# Patient Record
Sex: MALE | Race: WHITE | NOT HISPANIC OR LATINO | Employment: UNEMPLOYED | URBAN - METROPOLITAN AREA
[De-identification: names, ages, dates, MRNs, and addresses within clinical notes are randomized per-mention and may not be internally consistent; named-entity substitution may affect disease eponyms.]

---

## 2019-03-01 ENCOUNTER — HOSPITAL ENCOUNTER (INPATIENT)
Facility: HOSPITAL | Age: 34
LOS: 5 days | Discharge: HOME | DRG: 644 | End: 2019-03-06
Attending: EMERGENCY MEDICINE | Admitting: HOSPITALIST
Payer: MEDICAID

## 2019-03-01 DIAGNOSIS — E05.90 HYPERTHYROIDISM: ICD-10-CM

## 2019-03-01 DIAGNOSIS — I49.9 ARRHYTHMIA: ICD-10-CM

## 2019-03-01 DIAGNOSIS — E05.91 THYROTOXICOSIS WITH THYROTOXIC CRISIS, UNSPECIFIED THYROTOXICOSIS TYPE: Primary | ICD-10-CM

## 2019-03-01 DIAGNOSIS — I50.9 CHF (CONGESTIVE HEART FAILURE): ICD-10-CM

## 2019-03-01 DIAGNOSIS — E05.91 THYROID STORM: ICD-10-CM

## 2019-03-01 DIAGNOSIS — R73.9 HYPERGLYCEMIA: ICD-10-CM

## 2019-03-01 PROBLEM — E83.52 HYPERCALCEMIA: Status: ACTIVE | Noted: 2019-03-01

## 2019-03-01 PROBLEM — E44.0 MALNUTRITION OF MODERATE DEGREE: Status: ACTIVE | Noted: 2019-03-01

## 2019-03-01 LAB
ALBUMIN SERPL BCP-MCNC: 3.8 G/DL
ALP SERPL-CCNC: 149 U/L
ALT SERPL W/O P-5'-P-CCNC: 33 U/L
AMPHET+METHAMPHET UR QL: NEGATIVE
ANION GAP SERPL CALC-SCNC: 12 MMOL/L
AST SERPL-CCNC: 39 U/L
B-OH-BUTYR BLD STRIP-SCNC: 0.4 MMOL/L
BARBITURATES UR QL SCN>200 NG/ML: NEGATIVE
BASOPHILS # BLD AUTO: 0.1 K/UL
BASOPHILS NFR BLD: 0.6 %
BENZODIAZ UR QL SCN>200 NG/ML: NEGATIVE
BILIRUB SERPL-MCNC: 1.3 MG/DL
BILIRUB UR QL STRIP: NEGATIVE
BUN SERPL-MCNC: 17 MG/DL
BZE UR QL SCN: NEGATIVE
CALCIUM SERPL-MCNC: 12.4 MG/DL
CANNABINOIDS UR QL SCN: NEGATIVE
CHLORIDE SERPL-SCNC: 101 MMOL/L
CLARITY UR: CLEAR
CO2 SERPL-SCNC: 27 MMOL/L
COLOR UR: YELLOW
CREAT SERPL-MCNC: 0.7 MG/DL
CREAT UR-MCNC: 112.4 MG/DL
DIFFERENTIAL METHOD: ABNORMAL
EOSINOPHIL # BLD AUTO: 0.1 K/UL
EOSINOPHIL NFR BLD: 0.9 %
ERYTHROCYTE [DISTWIDTH] IN BLOOD BY AUTOMATED COUNT: 13.9 %
EST. GFR  (AFRICAN AMERICAN): >60 ML/MIN/1.73 M^2
EST. GFR  (NON AFRICAN AMERICAN): >60 ML/MIN/1.73 M^2
ESTIMATED AVG GLUCOSE: 111 MG/DL
GLUCOSE SERPL-MCNC: 103 MG/DL
GLUCOSE UR QL STRIP: NEGATIVE
HBA1C MFR BLD HPLC: 5.5 %
HCT VFR BLD AUTO: 40.2 %
HGB BLD-MCNC: 13.1 G/DL
HGB UR QL STRIP: ABNORMAL
KETONES UR QL STRIP: NEGATIVE
LEUKOCYTE ESTERASE UR QL STRIP: NEGATIVE
LIPASE SERPL-CCNC: 49 U/L
LYMPHOCYTES # BLD AUTO: 2.5 K/UL
LYMPHOCYTES NFR BLD: 22.2 %
MCH RBC QN AUTO: 25.3 PG
MCHC RBC AUTO-ENTMCNC: 32.6 G/DL
MCV RBC AUTO: 78 FL
METHADONE UR QL SCN>300 NG/ML: NEGATIVE
MONOCYTES # BLD AUTO: 1.1 K/UL
MONOCYTES NFR BLD: 10.1 %
NEUTROPHILS # BLD AUTO: 7.4 K/UL
NEUTROPHILS NFR BLD: 66.2 %
NITRITE UR QL STRIP: NEGATIVE
OPIATES UR QL SCN: NEGATIVE
PCP UR QL SCN>25 NG/ML: NEGATIVE
PH UR STRIP: 6 [PH] (ref 5–8)
PLATELET # BLD AUTO: 334 K/UL
PMV BLD AUTO: 8.5 FL
POCT GLUCOSE: 101 MG/DL (ref 70–110)
POTASSIUM SERPL-SCNC: 4.6 MMOL/L
PROT SERPL-MCNC: 7.8 G/DL
PROT UR QL STRIP: ABNORMAL
RBC # BLD AUTO: 5.17 M/UL
SODIUM SERPL-SCNC: 140 MMOL/L
SP GR UR STRIP: >=1.03 (ref 1–1.03)
T4 FREE SERPL-MCNC: 4.41 NG/DL
TOXICOLOGY INFORMATION: NORMAL
TSH SERPL DL<=0.005 MIU/L-ACNC: <0.01 UIU/ML
URN SPEC COLLECT METH UR: ABNORMAL
UROBILINOGEN UR STRIP-ACNC: NEGATIVE EU/DL
WBC # BLD AUTO: 11.2 K/UL

## 2019-03-01 PROCEDURE — 84439 ASSAY OF FREE THYROXINE: CPT

## 2019-03-01 PROCEDURE — 25000003 PHARM REV CODE 250: Performed by: EMERGENCY MEDICINE

## 2019-03-01 PROCEDURE — 25000003 PHARM REV CODE 250: Performed by: HOSPITALIST

## 2019-03-01 PROCEDURE — 80053 COMPREHEN METABOLIC PANEL: CPT

## 2019-03-01 PROCEDURE — 63600175 PHARM REV CODE 636 W HCPCS: Performed by: EMERGENCY MEDICINE

## 2019-03-01 PROCEDURE — 96374 THER/PROPH/DIAG INJ IV PUSH: CPT

## 2019-03-01 PROCEDURE — 83036 HEMOGLOBIN GLYCOSYLATED A1C: CPT

## 2019-03-01 PROCEDURE — 36415 COLL VENOUS BLD VENIPUNCTURE: CPT

## 2019-03-01 PROCEDURE — 81003 URINALYSIS AUTO W/O SCOPE: CPT

## 2019-03-01 PROCEDURE — 85025 COMPLETE CBC W/AUTO DIFF WBC: CPT

## 2019-03-01 PROCEDURE — 83690 ASSAY OF LIPASE: CPT

## 2019-03-01 PROCEDURE — 99285 EMERGENCY DEPT VISIT HI MDM: CPT

## 2019-03-01 PROCEDURE — 82010 KETONE BODYS QUAN: CPT

## 2019-03-01 PROCEDURE — 11000001 HC ACUTE MED/SURG PRIVATE ROOM

## 2019-03-01 PROCEDURE — 82962 GLUCOSE BLOOD TEST: CPT | Mod: 59

## 2019-03-01 PROCEDURE — 80307 DRUG TEST PRSMV CHEM ANLYZR: CPT

## 2019-03-01 PROCEDURE — 93005 ELECTROCARDIOGRAM TRACING: CPT

## 2019-03-01 PROCEDURE — 96361 HYDRATE IV INFUSION ADD-ON: CPT

## 2019-03-01 PROCEDURE — 84443 ASSAY THYROID STIM HORMONE: CPT

## 2019-03-01 RX ORDER — ONDANSETRON 2 MG/ML
INJECTION INTRAMUSCULAR; INTRAVENOUS
Status: DISPENSED
Start: 2019-03-01 | End: 2019-03-02

## 2019-03-01 RX ORDER — PROPRANOLOL HYDROCHLORIDE 20 MG/1
60 TABLET ORAL ONCE
Status: COMPLETED | OUTPATIENT
Start: 2019-03-01 | End: 2019-03-01

## 2019-03-01 RX ORDER — SODIUM CHLORIDE 0.9 % (FLUSH) 0.9 %
5 SYRINGE (ML) INJECTION
Status: DISCONTINUED | OUTPATIENT
Start: 2019-03-01 | End: 2019-03-06 | Stop reason: HOSPADM

## 2019-03-01 RX ORDER — GLUCAGON 1 MG
1 KIT INJECTION
Status: DISCONTINUED | OUTPATIENT
Start: 2019-03-01 | End: 2019-03-06 | Stop reason: HOSPADM

## 2019-03-01 RX ORDER — AMOXICILLIN 250 MG
1 CAPSULE ORAL 2 TIMES DAILY
Status: DISCONTINUED | OUTPATIENT
Start: 2019-03-01 | End: 2019-03-06 | Stop reason: HOSPADM

## 2019-03-01 RX ORDER — ONDANSETRON 2 MG/ML
4 INJECTION INTRAMUSCULAR; INTRAVENOUS
Status: COMPLETED | OUTPATIENT
Start: 2019-03-01 | End: 2019-03-01

## 2019-03-01 RX ORDER — RAMELTEON 8 MG/1
8 TABLET ORAL NIGHTLY PRN
Status: DISCONTINUED | OUTPATIENT
Start: 2019-03-01 | End: 2019-03-03

## 2019-03-01 RX ORDER — IBUPROFEN 200 MG
16 TABLET ORAL
Status: DISCONTINUED | OUTPATIENT
Start: 2019-03-01 | End: 2019-03-06 | Stop reason: HOSPADM

## 2019-03-01 RX ORDER — ACETAMINOPHEN 500 MG
1000 TABLET ORAL EVERY 6 HOURS PRN
Status: DISCONTINUED | OUTPATIENT
Start: 2019-03-01 | End: 2019-03-06 | Stop reason: HOSPADM

## 2019-03-01 RX ORDER — ONDANSETRON 2 MG/ML
8 INJECTION INTRAMUSCULAR; INTRAVENOUS EVERY 8 HOURS PRN
Status: DISCONTINUED | OUTPATIENT
Start: 2019-03-01 | End: 2019-03-06 | Stop reason: HOSPADM

## 2019-03-01 RX ORDER — PROPYLTHIOURACIL 50 MG/1
200 TABLET ORAL EVERY 4 HOURS
Status: DISCONTINUED | OUTPATIENT
Start: 2019-03-01 | End: 2019-03-03

## 2019-03-01 RX ORDER — SODIUM CHLORIDE 9 MG/ML
INJECTION, SOLUTION INTRAVENOUS CONTINUOUS
Status: DISCONTINUED | OUTPATIENT
Start: 2019-03-01 | End: 2019-03-04

## 2019-03-01 RX ORDER — LANOLIN ALCOHOL/MO/W.PET/CERES
800 CREAM (GRAM) TOPICAL
Status: DISCONTINUED | OUTPATIENT
Start: 2019-03-01 | End: 2019-03-06 | Stop reason: HOSPADM

## 2019-03-01 RX ORDER — PROPRANOLOL HYDROCHLORIDE 20 MG/1
40 TABLET ORAL EVERY 6 HOURS
Status: DISCONTINUED | OUTPATIENT
Start: 2019-03-02 | End: 2019-03-03

## 2019-03-01 RX ORDER — IBUPROFEN 200 MG
24 TABLET ORAL
Status: DISCONTINUED | OUTPATIENT
Start: 2019-03-01 | End: 2019-03-06 | Stop reason: HOSPADM

## 2019-03-01 RX ORDER — POTASSIUM CHLORIDE 20 MEQ/15ML
40 SOLUTION ORAL
Status: DISCONTINUED | OUTPATIENT
Start: 2019-03-01 | End: 2019-03-06 | Stop reason: HOSPADM

## 2019-03-01 RX ORDER — PROPYLTHIOURACIL 50 MG/1
200 TABLET ORAL ONCE
Status: COMPLETED | OUTPATIENT
Start: 2019-03-01 | End: 2019-03-01

## 2019-03-01 RX ADMIN — SODIUM CHLORIDE 1000 ML: 0.9 INJECTION, SOLUTION INTRAVENOUS at 04:03

## 2019-03-01 RX ADMIN — PROPYLTHIOURACIL 200 MG: 50 TABLET ORAL at 11:03

## 2019-03-01 RX ADMIN — PROPRANOLOL HYDROCHLORIDE 40 MG: 20 TABLET ORAL at 11:03

## 2019-03-01 RX ADMIN — SODIUM CHLORIDE: 0.9 INJECTION, SOLUTION INTRAVENOUS at 08:03

## 2019-03-01 RX ADMIN — ONDANSETRON 4 MG: 2 INJECTION INTRAMUSCULAR; INTRAVENOUS at 04:03

## 2019-03-01 RX ADMIN — PROPYLTHIOURACIL 200 MG: 50 TABLET ORAL at 06:03

## 2019-03-01 RX ADMIN — PROPRANOLOL HYDROCHLORIDE 60 MG: 20 TABLET ORAL at 06:03

## 2019-03-01 NOTE — PROGRESS NOTES
03/01/201912:22 PM  I have evaluated and performed a medical screening assessment on this patient while awaiting a thorough evaluation and treatment. All of the emergency department beds are occupied at this time. When appropriate, laboratory studies will be ordered from triage. The patient has been advised of this process and care plan. Patient complains of hyperglycemia > 300; increased urine output; no history of DM; vomiting and loosing weight. Fasting sugar this morning was 126.  Weight loss.     Orders pending: labs.   Disposition: stable

## 2019-03-01 NOTE — ED PROVIDER NOTES
Encounter Date: 3/1/2019    SCRIBE #1 NOTE: I, Radha Du, am scribing for, and in the presence of, Baldemar Ayala MD.       History     Chief Complaint   Patient presents with    Hyperglycemia     non diagnosed diabetic       Time seen by provider: 4:17 PM on 03/01/2019    Jeffrey Chavez is a 33 y.o. male who presents to the ED for evaluation of hyperglycemia. The patient reports that he has been experiencing increased urination, shakiness, and palpitations for a few weeks. He has also lost about 50 pounds in 1.5 months. His wife was testing his blood sugar last night, and the meter read over 300 mg/dL. He tested it multiple times this morning and his blood sugar was normal despite not eating today. The patient also reports vomiting today. He denies drug use, energy drinks, pre-workout, stimulants, or weight-loss drugs.   The patient denies fever or any other symptoms at this time. No PMHx or SHx noted. Current everyday smoekr (0.5 ppd). Allergies: Ibuprofen.       The history is provided by the patient.     Review of patient's allergies indicates:   Allergen Reactions    Ibuprofen Hives and Swelling     History reviewed. No pertinent past medical history.  History reviewed. No pertinent surgical history.  Family History   Problem Relation Age of Onset    Alcohol abuse Father     Alcohol abuse Maternal Uncle      Social History     Tobacco Use    Smoking status: Current Every Day Smoker     Packs/day: 0.50     Types: Cigarettes   Substance Use Topics    Alcohol use: Yes     Comment: occ    Drug use: No     Review of Systems   Constitutional: Positive for unexpected weight change (50 pounds in 1.5 months). Negative for fever.   HENT: Negative for sore throat.    Respiratory: Negative for shortness of breath.    Cardiovascular: Positive for palpitations. Negative for chest pain.   Gastrointestinal: Negative for nausea.   Endocrine: Positive for polyuria.   Genitourinary: Negative for dysuria.  "  Musculoskeletal: Negative for back pain.   Skin: Negative for rash.   Neurological: Positive for tremors ("shaky"). Negative for weakness.   Hematological: Does not bruise/bleed easily.       Physical Exam     Initial Vitals [03/01/19 1224]   BP Pulse Resp Temp SpO2   138/88 (!) 135 16 98.7 °F (37.1 °C) 96 %      MAP       --         Physical Exam    Nursing note and vitals reviewed.  Constitutional: He appears well-developed and well-nourished.  Non-toxic appearance. No distress.   Noticeably jittery.    HENT:   Head: Normocephalic and atraumatic.   Eyes: EOM are normal. Pupils are equal, round, and reactive to light.   No appreciable exopthalmos.   Neck: Normal range of motion. Neck supple. Thyromegaly present. No neck rigidity. No JVD present.   Cardiovascular: Regular rhythm, normal heart sounds and intact distal pulses. Tachycardia present.  Exam reveals no gallop and no friction rub.    No murmur heard.  Pulmonary/Chest: Breath sounds normal. He has no wheezes. He has no rhonchi. He has no rales.   Abdominal: Soft. Bowel sounds are normal. He exhibits no distension. There is no tenderness. There is no rigidity, no rebound and no guarding.   Musculoskeletal: Normal range of motion.   Neurological: He is alert and oriented to person, place, and time. He has normal strength and normal reflexes. No cranial nerve deficit or sensory deficit. He exhibits normal muscle tone. Coordination normal. GCS eye subscore is 4. GCS verbal subscore is 5. GCS motor subscore is 6.   Skin: Skin is warm and dry.   Psychiatric: He has a normal mood and affect. His speech is normal and behavior is normal. He is not actively hallucinating.         ED Course   Procedures  Labs Reviewed   CBC W/ AUTO DIFFERENTIAL - Abnormal; Notable for the following components:       Result Value    Hemoglobin 13.1 (*)     MCV 78 (*)     MCH 25.3 (*)     MPV 8.5 (*)     Mono # 1.1 (*)     All other components within normal limits   COMPREHENSIVE " METABOLIC PANEL - Abnormal; Notable for the following components:    Calcium 12.4 (*)     Total Bilirubin 1.3 (*)     Alkaline Phosphatase 149 (*)     All other components within normal limits    Narrative:      CA critical result(s) called and verbal readback obtained from   Nadja Zambrano RN, 03/01/2019 13:16   URINALYSIS, REFLEX TO URINE CULTURE - Abnormal; Notable for the following components:    Specific Gravity, UA >=1.030 (*)     Protein, UA Trace (*)     Occult Blood UA Trace (*)     All other components within normal limits    Narrative:     Preferred Collection Type->Urine, Clean Catch   TSH - Abnormal; Notable for the following components:    TSH <0.010 (*)     All other components within normal limits   T4, FREE - Abnormal; Notable for the following components:    Free T4 4.41 (*)     All other components within normal limits   BETA - HYDROXYBUTYRATE, SERUM   HEMOGLOBIN A1C   LIPASE   DRUG SCREEN PANEL, URINE EMERGENCY   POCT GLUCOSE   POCT GLUCOSE MONITORING CONTINUOUS        ECG Results          EKG 12-lead (In process)  Result time 03/01/19 15:55:44    In process by Interface, Lab In [facility] (03/01/19 15:55:44)                 Narrative:    Test Reason : R73.9,    Vent. Rate : 127 BPM     Atrial Rate : 127 BPM     P-R Int : 124 ms          QRS Dur : 090 ms      QT Int : 306 ms       P-R-T Axes : 054 068 055 degrees     QTc Int : 444 ms    Sinus tachycardia  Otherwise normal ECG  No previous ECGs available    Referred By: [email or web address]   SELF           Confirmed By:                             Imaging Results          US Soft Tissue Head Neck Thyroid (In process)                  Medical Decision Making:   History:   Old Medical Records: I decided to obtain old medical records.  Initial Assessment:   Patient is a 33-year-old man with 50 lb weight loss in a month and a half, tachycardia, tremors.  He is noted to have hyperthyroidism previously undiagnosed.  He has thyromegaly index abdominal son examination  as well as tachycardia.  Urine drug screen is negative. Patient has critically elevated calcium of 12.4 and is given IV fluids.  No changes in altered mental status.  I have low suspicion for decompensated heart failure, shock, malignant dysrhythmias or thromboembolic event.  Discussed with the hospitalist who agrees to admit the patient for further management.  Clinical Tests:   Lab Tests: Ordered and Reviewed  Radiological Study: Ordered and Reviewed            Scribe Attestation:   Scribe #1: I performed the above scribed service and the documentation accurately describes the services I performed. I attest to the accuracy of the note.    I, Jamie Johnson, personally performed the services described in this documentation. All medical record entries made by the scribe were at my direction and in my presence.  I have reviewed the chart and agree that the record reflects my personal performance and is accurate and complete. Baldemar Ayala MD.  10:42 PM 03/01/2019       DISCLAIMER: This note was prepared with [employer] voice recognition transcription software. Garbled syntax, mangled pronouns, and other bizarre constructions may be attributed to that software system.             Clinical Impression:     1. Hyperglycemia    2. Thyroid storm    3. CHF (congestive heart failure)                                 Baldemar Ayala MD  03/01/19 2452

## 2019-03-02 LAB
ALBUMIN SERPL BCP-MCNC: 3.1 G/DL
ALP SERPL-CCNC: 120 U/L
ALT SERPL W/O P-5'-P-CCNC: 37 U/L
ANION GAP SERPL CALC-SCNC: 8 MMOL/L
AORTIC ROOT ANNULUS: 2.98 CM
AORTIC VALVE CUSP SEPERATION: 1.67 CM
AST SERPL-CCNC: 49 U/L
AV INDEX (PROSTH): 0.67
AV MEAN GRADIENT: 9.57 MMHG
AV PEAK GRADIENT: 15.84 MMHG
AV VALVE AREA: 1.99 CM2
AV VELOCITY RATIO: 0.77
BASOPHILS # BLD AUTO: 0 K/UL
BASOPHILS NFR BLD: 0.5 %
BILIRUB SERPL-MCNC: 1.4 MG/DL
BSA FOR ECHO PROCEDURE: 1.85 M2
BUN SERPL-MCNC: 16 MG/DL
CALCIUM SERPL-MCNC: 11.1 MG/DL
CHLORIDE SERPL-SCNC: 103 MMOL/L
CO2 SERPL-SCNC: 25 MMOL/L
CREAT SERPL-MCNC: 0.6 MG/DL
CV ECHO LV RWT: 0.35 CM
DIFFERENTIAL METHOD: ABNORMAL
DOP CALC AO PEAK VEL: 1.99 M/S
DOP CALC AO VTI: 34.78 CM
DOP CALC LVOT AREA: 2.95 CM2
DOP CALC LVOT DIAMETER: 1.94 CM
DOP CALC LVOT PEAK VEL: 1.53 M/S
DOP CALC LVOT STROKE VOLUME: 69.28 CM3
DOP CALCLVOT PEAK VEL VTI: 23.45 CM
E WAVE DECELERATION TIME: 207.8 MSEC
E/A RATIO: 1.58
E/E' RATIO: 10.95
ECHO LV POSTERIOR WALL: 0.9 CM (ref 0.6–1.1)
EOSINOPHIL # BLD AUTO: 0.1 K/UL
EOSINOPHIL NFR BLD: 1.5 %
ERYTHROCYTE [DISTWIDTH] IN BLOOD BY AUTOMATED COUNT: 13.6 %
EST. GFR  (AFRICAN AMERICAN): >60 ML/MIN/1.73 M^2
EST. GFR  (NON AFRICAN AMERICAN): >60 ML/MIN/1.73 M^2
FRACTIONAL SHORTENING: 37 % (ref 28–44)
GLUCOSE SERPL-MCNC: 104 MG/DL
HCT VFR BLD AUTO: 34.3 %
HGB BLD-MCNC: 11.3 G/DL
INTERVENTRICULAR SEPTUM: 0.9 CM (ref 0.6–1.1)
IVRT: 0.06 MSEC
LEFT ATRIUM SIZE: 3.61 CM
LEFT INTERNAL DIMENSION IN SYSTOLE: 3.23 CM (ref 2.1–4)
LEFT VENTRICLE DIASTOLIC VOLUME INDEX: 68.35 ML/M2
LEFT VENTRICLE DIASTOLIC VOLUME: 125.88 ML
LEFT VENTRICLE MASS INDEX: 90 G/M2
LEFT VENTRICLE SYSTOLIC VOLUME INDEX: 22.7 ML/M2
LEFT VENTRICLE SYSTOLIC VOLUME: 41.83 ML
LEFT VENTRICULAR INTERNAL DIMENSION IN DIASTOLE: 5.14 CM (ref 3.5–6)
LEFT VENTRICULAR MASS: 165.72 G
LV LATERAL E/E' RATIO: 10.45
LV SEPTAL E/E' RATIO: 11.5
LYMPHOCYTES # BLD AUTO: 1.4 K/UL
LYMPHOCYTES NFR BLD: 28 %
MAGNESIUM SERPL-MCNC: 1.7 MG/DL
MCH RBC QN AUTO: 25.4 PG
MCHC RBC AUTO-ENTMCNC: 32.8 G/DL
MCV RBC AUTO: 77 FL
MONOCYTES # BLD AUTO: 0.2 K/UL
MONOCYTES NFR BLD: 4.5 %
MV PEAK A VEL: 0.73 M/S
MV PEAK E VEL: 1.15 M/S
NEUTROPHILS # BLD AUTO: 3.2 K/UL
NEUTROPHILS NFR BLD: 65.5 %
PHOSPHATE SERPL-MCNC: 2.8 MG/DL
PISA TR MAX VEL: 2.79 M/S
PLATELET # BLD AUTO: 276 K/UL
PMV BLD AUTO: 8.8 FL
POCT GLUCOSE: 108 MG/DL (ref 70–110)
POCT GLUCOSE: 162 MG/DL (ref 70–110)
POTASSIUM SERPL-SCNC: 4.2 MMOL/L
PROT SERPL-MCNC: 6.5 G/DL
PV PEAK VELOCITY: 1.15 CM/S
RA PRESSURE: 15 MMHG
RBC # BLD AUTO: 4.44 M/UL
RIGHT VENTRICULAR END-DIASTOLIC DIMENSION: 2.98 CM
SODIUM SERPL-SCNC: 136 MMOL/L
T3FREE SERPL-MCNC: >20 PG/ML
T4 FREE SERPL-MCNC: 4.46 NG/DL
TDI LATERAL: 0.11
TDI SEPTAL: 0.1
TDI: 0.11
TR MAX PG: 31.14 MMHG
TRICUSPID ANNULAR PLANE SYSTOLIC EXCURSION: 2.39 CM
TV REST PULMONARY ARTERY PRESSURE: 46 MMHG
WBC # BLD AUTO: 4.9 K/UL

## 2019-03-02 PROCEDURE — 84439 ASSAY OF FREE THYROXINE: CPT

## 2019-03-02 PROCEDURE — 11000001 HC ACUTE MED/SURG PRIVATE ROOM

## 2019-03-02 PROCEDURE — 25000003 PHARM REV CODE 250: Performed by: HOSPITALIST

## 2019-03-02 PROCEDURE — 83735 ASSAY OF MAGNESIUM: CPT

## 2019-03-02 PROCEDURE — 94761 N-INVAS EAR/PLS OXIMETRY MLT: CPT

## 2019-03-02 PROCEDURE — 63600175 PHARM REV CODE 636 W HCPCS: Performed by: HOSPITALIST

## 2019-03-02 PROCEDURE — 85025 COMPLETE CBC W/AUTO DIFF WBC: CPT

## 2019-03-02 PROCEDURE — 97161 PT EVAL LOW COMPLEX 20 MIN: CPT

## 2019-03-02 PROCEDURE — 84481 FREE ASSAY (FT-3): CPT

## 2019-03-02 PROCEDURE — 97116 GAIT TRAINING THERAPY: CPT

## 2019-03-02 PROCEDURE — 80053 COMPREHEN METABOLIC PANEL: CPT

## 2019-03-02 PROCEDURE — 36415 COLL VENOUS BLD VENIPUNCTURE: CPT

## 2019-03-02 PROCEDURE — 84100 ASSAY OF PHOSPHORUS: CPT

## 2019-03-02 PROCEDURE — S4991 NICOTINE PATCH NONLEGEND: HCPCS | Performed by: HOSPITALIST

## 2019-03-02 RX ORDER — NICOTINE 7MG/24HR
1 PATCH, TRANSDERMAL 24 HOURS TRANSDERMAL DAILY
Status: DISCONTINUED | OUTPATIENT
Start: 2019-03-02 | End: 2019-03-06 | Stop reason: HOSPADM

## 2019-03-02 RX ADMIN — PROPYLTHIOURACIL 200 MG: 50 TABLET ORAL at 02:03

## 2019-03-02 RX ADMIN — HYDROCORTISONE SODIUM SUCCINATE 100 MG: 100 INJECTION, POWDER, FOR SOLUTION INTRAMUSCULAR; INTRAVENOUS at 02:03

## 2019-03-02 RX ADMIN — HYDROCORTISONE SODIUM SUCCINATE 100 MG: 100 INJECTION, POWDER, FOR SOLUTION INTRAMUSCULAR; INTRAVENOUS at 10:03

## 2019-03-02 RX ADMIN — PROPYLTHIOURACIL 200 MG: 50 TABLET ORAL at 05:03

## 2019-03-02 RX ADMIN — PROPYLTHIOURACIL 200 MG: 50 TABLET ORAL at 09:03

## 2019-03-02 RX ADMIN — NICOTINE 1 PATCH: 7 PATCH, EXTENDED RELEASE TRANSDERMAL at 06:03

## 2019-03-02 RX ADMIN — PROPRANOLOL HYDROCHLORIDE 40 MG: 20 TABLET ORAL at 05:03

## 2019-03-02 RX ADMIN — SODIUM CHLORIDE: 0.9 INJECTION, SOLUTION INTRAVENOUS at 09:03

## 2019-03-02 RX ADMIN — HYDROCORTISONE SODIUM SUCCINATE 100 MG: 100 INJECTION, POWDER, FOR SOLUTION INTRAMUSCULAR; INTRAVENOUS at 07:03

## 2019-03-02 RX ADMIN — SODIUM CHLORIDE: 0.9 INJECTION, SOLUTION INTRAVENOUS at 01:03

## 2019-03-02 RX ADMIN — PROPYLTHIOURACIL 200 MG: 50 TABLET ORAL at 06:03

## 2019-03-02 RX ADMIN — DOCUSATE SODIUM AND SENNOSIDES 1 TABLET: 8.6; 5 TABLET, FILM COATED ORAL at 09:03

## 2019-03-02 RX ADMIN — PROPYLTHIOURACIL 200 MG: 50 TABLET ORAL at 01:03

## 2019-03-02 RX ADMIN — PROPRANOLOL HYDROCHLORIDE 40 MG: 20 TABLET ORAL at 02:03

## 2019-03-02 RX ADMIN — PROPRANOLOL HYDROCHLORIDE 40 MG: 20 TABLET ORAL at 06:03

## 2019-03-02 RX ADMIN — PROPYLTHIOURACIL 200 MG: 50 TABLET ORAL at 10:03

## 2019-03-02 NOTE — ASSESSMENT & PLAN NOTE
May be due to thyrotoxicosis.  Monitor Ca while getting treatment.  ]  Calcium   Date Value Ref Range Status   03/02/2019 11.1 (H) 8.7 - 10.5 mg/dL Final

## 2019-03-02 NOTE — PT/OT/SLP DISCHARGE
Physical Therapy Discharge Summary    Name: Jeffrey Chavez  MRN: [id number]   Principal Problem: Thyrotoxicosis with thyrotoxic crisis     Patient Discharged from acute Physical Therapy on 03/02/19.  Please refer to prior PT noted date on 03/02/19 for functional status.     Assessment:     seen for eval only    Objective:     GOALS:   Multidisciplinary Problems     Physical Therapy Goals     Not on file                Reasons for Discontinuation of Therapy Services  Therapist determines that the patient will no longer benefit from therapy services.      Plan:     Patient Discharged to: In house with walking program.    Vega Carroll, PT  3/2/2019

## 2019-03-02 NOTE — PT/OT/SLP EVAL
Physical Therapy Evaluation    Patient Name:  Jeffrey Chavez   MRN:  [id number]    Recommendations:     Discharge Recommendations:  other (see comments)(home)   Discharge Equipment Recommendations: none   Barriers to discharge: None    Assessment:     Jeffrey Chavez is a 33 y.o. male admitted with a medical diagnosis of Thyrotoxicosis with thyrotoxic crisis.  He presents with the following impairments/functional limitations:    n/a.    Rehab Prognosis: n/a; patient would benefit from acute skilled PT services to address these deficits and reach maximum level of function.    Recent Surgery: * No surgery found *      Plan:     During this hospitalization, patient to be seen 1 x/week(eval only) to address the identified rehab impairments via   and progress toward the following goals:    · Plan of Care Expires:  03/02/19    Subjective     Chief Complaint: n/a  Patient/Family Comments/goals: n/a  Pain/Comfort:  · Pain Rating 1: 0/10    Patients cultural, spiritual, Hoahaoism conflicts given the current situation:      Living Environment:  Lives w/ family in 1-story home.  Prior to admission, patients level of function was independent.  Equipment used at home: none.  DME owned (not currently used): none.  Upon discharge, patient will have assistance from family.    Objective:     Communicated with RN prior to session.  Patient found supine upon PT entry to room.    General Precautions: Standard, contact   Orthopedic Precautions:N/A   Braces: N/A     Exams:  · RLE ROM: WNL  · RLE Strength: WNL  · LLE ROM: WNL  · LLE Strength: WNL    Functional Mobility:  · Bed Mobility:     · Supine to Sit: independence  · Transfers:     · Sit to Stand:  independence with no AD  · Gait: 150 ft w/ IV pole and mod indep.      Therapeutic Activities and Exercises:   n/a    AM-PAC 6 CLICK MOBILITY  Total Score:21     Patient left sitting EOB with all lines intact and call button in reach.    GOALS:   Multidisciplinary Problems      Physical Therapy Goals     Not on file                History:     History reviewed. No pertinent past medical history.    History reviewed. No pertinent surgical history.    Time Tracking:     PT Received On: 03/02/19  PT Start Time: 1330     PT Stop Time: 1355  PT Total Time (min): 25 min     Billable Minutes: Evaluation 15 and Gait Training 10      Vega Carroll, PT  03/02/2019

## 2019-03-02 NOTE — PLAN OF CARE
Problem: Adult Inpatient Plan of Care  Goal: Plan of Care Review  Outcome: Ongoing (interventions implemented as appropriate)  Pt. Rested well during the night. CT of chest and US carotid done tonight. TTE schedule for today. NM thyroid scan might not be able to be done until Wednesday or Thursday. Ambulates independently to the restroom. IV fluids going at 125 mL/hr. Daily wt. Labs monitor. Will continue to monitor.

## 2019-03-02 NOTE — PROGRESS NOTES
03/02/19 0812   Patient Assessment/Suction   Level of Consciousness (AVPU) alert   PRE-TX-O2   O2 Device (Oxygen Therapy) room air   SpO2 97 %   Pulse Oximetry Type Intermittent   $ Pulse Oximetry - Multiple Charge Pulse Oximetry - Multiple   Pulse 95   Resp 18

## 2019-03-02 NOTE — ASSESSMENT & PLAN NOTE
Patient was severe weight loss of approximately 50 lb.  Will consult Nutrition, and encourage patient p.o. Intake.  Likely etiology is related to hyperthyroidism.

## 2019-03-02 NOTE — H&P
"[facility] Hospital Medicine  History & Physical    Patient Name: Jeffrey Chavez  MRN: [id number]  Admission Date: 3/1/2019  Attending Physician: Adebayo Kincaid MD  Primary Care Provider: Primary Doctor No         Patient information was obtained from patient, past medical records and ER records.     Subjective:     Principal Problem:Thyrotoxicosis with thyrotoxic crisis    Chief Complaint:   Chief Complaint   Patient presents with    Hyperglycemia     non diagnosed diabetic        HPI: Patient is a 33-year-old with no past medical history who presents the hospital with a 50 lb weight loss over the past 2 months, as well as severe fatigue, tremor and difficulty sleeping.  Patient denies fever, night sweats, or other B symptoms.  He has had intermittent diarrhea and cough, but denies any other symptoms.  Patient states that he was trying to "tough it out "but on the day of admission he started developing shortness of breath and that scared him to come to the hospital.  In the emergency department, patient was found to be tachycardic, tachypneic, and with a fine tremor.  Labs indicate evidence of   Thyrotoxicosis.    History reviewed. No pertinent past medical history.    History reviewed. No pertinent surgical history.    Review of patient's allergies indicates:   Allergen Reactions    Ibuprofen Hives and Swelling       No current facility-administered medications on file prior to encounter.      Current Outpatient Medications on File Prior to Encounter   Medication Sig    [DISCONTINUED] hydrocodone-acetaminophen 5-325mg (NORCO) 5-325 mg per tablet Take 1 tablet by mouth every 6 (six) hours as needed for Pain.    [DISCONTINUED] methocarbamol (ROBAXIN) 500 MG Tab Take 2 tablets (1,000 mg total) by mouth 3 (three) times daily.     Family History     None        Tobacco Use    Smoking status: Current Every Day Smoker     Packs/day: 0.50     Types: Cigarettes   Substance and Sexual Activity    " Alcohol use: Yes     Comment: occ    Drug use: No    Sexual activity: Not on file     Review of Systems   Constitutional: Positive for activity change, appetite change, fatigue and unexpected weight change. Negative for fever.   HENT: Positive for sore throat. Negative for ear discharge and facial swelling.    Eyes: Negative for photophobia, pain and visual disturbance.   Respiratory: Positive for cough. Negative for apnea and shortness of breath.    Cardiovascular: Negative for chest pain and leg swelling.   Gastrointestinal: Positive for diarrhea. Negative for abdominal pain and blood in stool.   Endocrine: Negative for cold intolerance and heat intolerance.   Musculoskeletal: Negative for back pain and gait problem.   Skin: Negative for pallor and rash.   Neurological: Positive for tremors. Negative for speech difficulty and headaches.   Psychiatric/Behavioral: Positive for sleep disturbance. Negative for confusion, hallucinations and suicidal ideas. The patient is nervous/anxious.    All other systems reviewed and are negative.    Objective:     Vital Signs (Most Recent):  Temp: 97.9 °F (36.6 °C) (03/01/19 1912)  Pulse: (!) 125 (03/01/19 1912)  Resp: 16 (03/01/19 1912)  BP: (!) 143/76 (03/01/19 1912)  SpO2: 96 % (03/01/19 1912) Vital Signs (24h Range):  Temp:  [97.9 °F (36.6 °C)-98.7 °F (37.1 °C)] 97.9 °F (36.6 °C)  Pulse:  [119-147] 125  Resp:  [16] 16  SpO2:  [93 %-97 %] 96 %  BP: (116-143)/(63-88) 143/76     Weight: 68.6 kg (151 lb 3.8 oz)  Body mass index is 22.33 kg/m².    Physical Exam   Constitutional: He is oriented to person, place, and time. No distress.   Thin, ill-appearing young man.   HENT:   Head: Normocephalic.   Mouth/Throat: Oropharynx is clear and moist.   Eyes: Conjunctivae are normal. Right eye exhibits no discharge. Left eye exhibits no discharge. No scleral icterus.   Exophthalmos noted   Neck: No JVD present.   Greatly enlarged thyroid gland palpable.  Nontender.   Cardiovascular:  Normal heart sounds and intact distal pulses. Exam reveals no friction rub.   No murmur heard.  Tachycardia.   Pulmonary/Chest: Breath sounds normal. No respiratory distress. He has no wheezes.   Tachypnea with increased respiratory effort evident.   Abdominal: Soft. Bowel sounds are normal. He exhibits no distension. There is no tenderness.   Musculoskeletal: Normal range of motion. He exhibits no edema.   Lymphadenopathy:     He has no cervical adenopathy.   Neurological: He is alert and oriented to person, place, and time. He has normal reflexes.   Fine tremor noted   Skin: Skin is warm. No rash noted. He is not diaphoretic. No erythema.   Psychiatric: He has a normal mood and affect. His behavior is normal.   Nursing note and vitals reviewed.          Significant Labs: All pertinent labs within the past 24 hours have been reviewed.    Significant Imaging: I have reviewed all pertinent imaging results/findings within the past 24 hours.    Assessment/Plan:     * Thyrotoxicosis with thyrotoxic crisis     Patient with evidence of thyrotoxicosis and  Impending thyroid storm.  Will start patient on PTU, propranolol, glucocorticoids, and monitor patient closely.  No indication for SSKI currently.  Patient will need thyroid ultrasound, and uptake scan to look for toxic nodule versus Graves disease.  TSH less than 0.01.  Check free T4 and T3.  Will discuss case with Endocrinology.  No need for transfer at the moment, although the patient does not improve will potentially need radio ablation of his thyroid gland emergently versus thyroid surgery.  Will monitor patient very closely in the hospital.       Malnutrition of moderate degree     Patient was severe weight loss of approximately 50 lb.  Will consult Nutrition, and encourage patient p.o. Intake.  Likely etiology is related to hyperthyroidism.       Hypercalcemia     Literature review suggested hypercalcemia may be a secondary effect of thyrotoxicosis.  Will treat  with IV fluids to start with and monitor closely.         VTE Risk Mitigation (From admission, onward)        Ordered     IP VTE LOW RISK PATIENT  Once      03/01/19 1947     Place JENNY hose  Until discontinued      03/01/19 1947             Adebayo Kincaid MD  Department of Hospital Medicine   [facility]

## 2019-03-02 NOTE — ASSESSMENT & PLAN NOTE
Patient with evidence of thyrotoxicosis and  Impending thyroid storm.  Will start patient on PTU, propranolol, glucocorticoids, and monitor patient closely.  No indication for SSKI currently.  Patient will need thyroid ultrasound, and uptake scan to look for toxic nodule versus Graves disease.  TSH less than 0.01.  Check free T4 and T3.  Will discuss case with Endocrinology.  No need for transfer at the moment, although the patient does not improve will potentially need radio ablation of his thyroid gland emergently versus thyroid surgery.  Will monitor patient very closely in the hospital.

## 2019-03-02 NOTE — SUBJECTIVE & OBJECTIVE
History reviewed. No pertinent past medical history.    History reviewed. No pertinent surgical history.    Review of patient's allergies indicates:   Allergen Reactions    Ibuprofen Hives and Swelling       No current facility-administered medications on file prior to encounter.      Current Outpatient Medications on File Prior to Encounter   Medication Sig    [DISCONTINUED] hydrocodone-acetaminophen 5-325mg (NORCO) 5-325 mg per tablet Take 1 tablet by mouth every 6 (six) hours as needed for Pain.    [DISCONTINUED] methocarbamol (ROBAXIN) 500 MG Tab Take 2 tablets (1,000 mg total) by mouth 3 (three) times daily.     Family History     None        Tobacco Use    Smoking status: Current Every Day Smoker     Packs/day: 0.50     Types: Cigarettes   Substance and Sexual Activity    Alcohol use: Yes     Comment: occ    Drug use: No    Sexual activity: Not on file     Review of Systems   Constitutional: Positive for activity change, appetite change, fatigue and unexpected weight change. Negative for fever.   HENT: Positive for sore throat. Negative for ear discharge and facial swelling.    Eyes: Negative for photophobia, pain and visual disturbance.   Respiratory: Positive for cough. Negative for apnea and shortness of breath.    Cardiovascular: Negative for chest pain and leg swelling.   Gastrointestinal: Positive for diarrhea. Negative for abdominal pain and blood in stool.   Endocrine: Negative for cold intolerance and heat intolerance.   Musculoskeletal: Negative for back pain and gait problem.   Skin: Negative for pallor and rash.   Neurological: Positive for tremors. Negative for speech difficulty and headaches.   Psychiatric/Behavioral: Positive for sleep disturbance. Negative for confusion, hallucinations and suicidal ideas. The patient is nervous/anxious.    All other systems reviewed and are negative.    Objective:     Vital Signs (Most Recent):  Temp: 97.9 °F (36.6 °C) (03/01/19 1912)  Pulse: (!) 125  (03/01/19 1912)  Resp: 16 (03/01/19 1912)  BP: (!) 143/76 (03/01/19 1912)  SpO2: 96 % (03/01/19 1912) Vital Signs (24h Range):  Temp:  [97.9 °F (36.6 °C)-98.7 °F (37.1 °C)] 97.9 °F (36.6 °C)  Pulse:  [119-147] 125  Resp:  [16] 16  SpO2:  [93 %-97 %] 96 %  BP: (116-143)/(63-88) 143/76     Weight: 68.6 kg (151 lb 3.8 oz)  Body mass index is 22.33 kg/m².    Physical Exam   Constitutional: He is oriented to person, place, and time. No distress.   Thin, ill-appearing young man.   HENT:   Head: Normocephalic.   Mouth/Throat: Oropharynx is clear and moist.   Eyes: Conjunctivae are normal. Right eye exhibits no discharge. Left eye exhibits no discharge. No scleral icterus.   Exophthalmos noted   Neck: No JVD present.   Greatly enlarged thyroid gland palpable.  Nontender.   Cardiovascular: Normal heart sounds and intact distal pulses. Exam reveals no friction rub.   No murmur heard.  Tachycardia.   Pulmonary/Chest: Breath sounds normal. No respiratory distress. He has no wheezes.   Tachypnea with increased respiratory effort evident.   Abdominal: Soft. Bowel sounds are normal. He exhibits no distension. There is no tenderness.   Musculoskeletal: Normal range of motion. He exhibits no edema.   Lymphadenopathy:     He has no cervical adenopathy.   Neurological: He is alert and oriented to person, place, and time. He has normal reflexes.   Fine tremor noted   Skin: Skin is warm. No rash noted. He is not diaphoretic. No erythema.   Psychiatric: He has a normal mood and affect. His behavior is normal.   Nursing note and vitals reviewed.          Significant Labs: All pertinent labs within the past 24 hours have been reviewed.    Significant Imaging: I have reviewed all pertinent imaging results/findings within the past 24 hours.

## 2019-03-02 NOTE — HPI
"Patient is a 33-year-old with no past medical history who presents the hospital with a 50 lb weight loss over the past 2 months, as well as severe fatigue, tremor and difficulty sleeping.  Patient denies fever, night sweats, or other B symptoms.  He has had intermittent diarrhea and cough, but denies any other symptoms.  Patient states that he was trying to "tough it out "but on the day of admission he started developing shortness of breath and that scared him to come to the hospital.  In the emergency department, patient was found to be tachycardic, tachypneic, and with a fine tremor.  Labs indicate evidence of   Thyrotoxicosis.  "

## 2019-03-02 NOTE — PLAN OF CARE
03/02/19 1556   Discharge Assessment   Assessment Type Discharge Planning Assessment   Confirmed/corrected address and phone number on facesheet? Yes   Assessment information obtained from? Patient   Prior to hospitilization cognitive status: Alert/Oriented   Prior to hospitalization functional status: Independent   Current cognitive status: Alert/Oriented   Current Functional Status: Independent   Facility Arrived From: home   Lives With significant other;child(ze), dependent   Able to Return to Prior Arrangements yes   Is patient able to care for self after discharge? Yes   Who are your caregiver(s) and their phone number(s)? mother:  Anita Chavez  [phone]   Patient's perception of discharge disposition home or selfcare   Readmission Within the Last 30 Days no previous admission in last 30 days   Patient currently being followed by outpatient case management? No   Patient currently receives any other outside agency services? No   Equipment Currently Used at Home glucometer   Do you have any problems affording any of your prescribed medications? No   Is the patient taking medications as prescribed? yes   Does the patient have transportation home? Yes   Transportation Anticipated family or friend will provide   Does the patient receive services at the [facility]? No   Discharge Plan A Home with family   Discharge Plan B Home with family   DME Needed Upon Discharge  none   Patient/Family in Agreement with Plan yes

## 2019-03-03 LAB
ANION GAP SERPL CALC-SCNC: 8 MMOL/L
BUN SERPL-MCNC: 12 MG/DL
CA-I BLDV-SCNC: 1.33 MMOL/L
CALCIUM SERPL-MCNC: 10.2 MG/DL
CHLORIDE SERPL-SCNC: 106 MMOL/L
CO2 SERPL-SCNC: 26 MMOL/L
CREAT SERPL-MCNC: 0.6 MG/DL
EST. GFR  (AFRICAN AMERICAN): >60 ML/MIN/1.73 M^2
EST. GFR  (NON AFRICAN AMERICAN): >60 ML/MIN/1.73 M^2
GLUCOSE SERPL-MCNC: 101 MG/DL
POTASSIUM SERPL-SCNC: 3.4 MMOL/L
SODIUM SERPL-SCNC: 140 MMOL/L

## 2019-03-03 PROCEDURE — 82330 ASSAY OF CALCIUM: CPT

## 2019-03-03 PROCEDURE — 83520 IMMUNOASSAY QUANT NOS NONAB: CPT

## 2019-03-03 PROCEDURE — 93005 ELECTROCARDIOGRAM TRACING: CPT

## 2019-03-03 PROCEDURE — 25000003 PHARM REV CODE 250: Performed by: HOSPITALIST

## 2019-03-03 PROCEDURE — 11000001 HC ACUTE MED/SURG PRIVATE ROOM

## 2019-03-03 PROCEDURE — 80048 BASIC METABOLIC PNL TOTAL CA: CPT

## 2019-03-03 PROCEDURE — S4991 NICOTINE PATCH NONLEGEND: HCPCS | Performed by: HOSPITALIST

## 2019-03-03 PROCEDURE — 36415 COLL VENOUS BLD VENIPUNCTURE: CPT

## 2019-03-03 PROCEDURE — 94761 N-INVAS EAR/PLS OXIMETRY MLT: CPT

## 2019-03-03 PROCEDURE — 84445 ASSAY OF TSI GLOBULIN: CPT

## 2019-03-03 PROCEDURE — 63600175 PHARM REV CODE 636 W HCPCS: Performed by: HOSPITALIST

## 2019-03-03 RX ORDER — DIAZEPAM 5 MG/1
10 TABLET ORAL NIGHTLY PRN
Status: DISCONTINUED | OUTPATIENT
Start: 2019-03-03 | End: 2019-03-06 | Stop reason: HOSPADM

## 2019-03-03 RX ORDER — PROPRANOLOL HYDROCHLORIDE 20 MG/1
20 TABLET ORAL EVERY 6 HOURS
Status: DISCONTINUED | OUTPATIENT
Start: 2019-03-03 | End: 2019-03-06 | Stop reason: HOSPADM

## 2019-03-03 RX ORDER — PROPYLTHIOURACIL 50 MG/1
100 TABLET ORAL EVERY 8 HOURS
Status: DISCONTINUED | OUTPATIENT
Start: 2019-03-03 | End: 2019-03-06 | Stop reason: HOSPADM

## 2019-03-03 RX ADMIN — NICOTINE 1 PATCH: 7 PATCH, EXTENDED RELEASE TRANSDERMAL at 09:03

## 2019-03-03 RX ADMIN — PROPYLTHIOURACIL 200 MG: 50 TABLET ORAL at 05:03

## 2019-03-03 RX ADMIN — PROPRANOLOL HYDROCHLORIDE 20 MG: 20 TABLET ORAL at 11:03

## 2019-03-03 RX ADMIN — PROPYLTHIOURACIL 200 MG: 50 TABLET ORAL at 09:03

## 2019-03-03 RX ADMIN — PROPYLTHIOURACIL 200 MG: 50 TABLET ORAL at 01:03

## 2019-03-03 RX ADMIN — SODIUM CHLORIDE: 0.9 INJECTION, SOLUTION INTRAVENOUS at 01:03

## 2019-03-03 RX ADMIN — HYDROCORTISONE SODIUM SUCCINATE 100 MG: 100 INJECTION, POWDER, FOR SOLUTION INTRAMUSCULAR; INTRAVENOUS at 05:03

## 2019-03-03 RX ADMIN — HYDROCORTISONE SODIUM SUCCINATE 100 MG: 100 INJECTION, POWDER, FOR SOLUTION INTRAMUSCULAR; INTRAVENOUS at 09:03

## 2019-03-03 RX ADMIN — PROPRANOLOL HYDROCHLORIDE 40 MG: 20 TABLET ORAL at 12:03

## 2019-03-03 RX ADMIN — PROPYLTHIOURACIL 100 MG: 50 TABLET ORAL at 09:03

## 2019-03-03 RX ADMIN — PROPRANOLOL HYDROCHLORIDE 40 MG: 20 TABLET ORAL at 05:03

## 2019-03-03 RX ADMIN — PROPRANOLOL HYDROCHLORIDE 40 MG: 20 TABLET ORAL at 01:03

## 2019-03-03 RX ADMIN — HYDROCORTISONE SODIUM SUCCINATE 100 MG: 100 INJECTION, POWDER, FOR SOLUTION INTRAMUSCULAR; INTRAVENOUS at 03:03

## 2019-03-03 RX ADMIN — SODIUM CHLORIDE: 0.9 INJECTION, SOLUTION INTRAVENOUS at 05:03

## 2019-03-03 RX ADMIN — PROPRANOLOL HYDROCHLORIDE 20 MG: 20 TABLET ORAL at 05:03

## 2019-03-03 NOTE — PLAN OF CARE
"Pt noted to have 1.6 second pauses on telemetry monitor. Pt asymptomatic at this time but stated he had felt like "my chest was uncomfortable last night and a little hard to breathe while asleep." Dr. Gaffney notified on unit and propanolol dose modified. Will continue to monitor.  "

## 2019-03-03 NOTE — PROGRESS NOTES
"[facility] Medicine  Progress Note    Patient Name: Jeffrey Chavez  MRN: [id number]  Patient Class: IP- Inpatient   Admission Date: 3/1/2019  Length of Stay: 1 days  Attending Physician: Russ Gaffney MD  Primary Care Provider: Primary Doctor No        Subjective:     Principal Problem:Thyrotoxicosis with thyrotoxic crisis    HPI:  Patient is a 33-year-old with no past medical history who presents the hospital with a 50 lb weight loss over the past 2 months, as well as severe fatigue, tremor and difficulty sleeping.  Patient denies fever, night sweats, or other B symptoms.  He has had intermittent diarrhea and cough, but denies any other symptoms.  Patient states that he was trying to "tough it out "but on the day of admission he started developing shortness of breath and that scared him to come to the hospital.  In the emergency department, patient was found to be tachycardic, tachypneic, and with a fine tremor.  Labs indicate evidence of   Thyrotoxicosis.    Hospital Course:  No notes on file    Interval History:  Pulse less than 100.  Much fewer muscle tremors.  No SOB.    Review of Systems   Constitutional: Negative for fatigue and fever.   Respiratory: Negative for cough and shortness of breath.    Cardiovascular: Negative for chest pain.   Gastrointestinal: Negative for abdominal pain.     Objective:     Vital Signs (Most Recent):  Temp: 96.1 °F (35.6 °C) (03/02/19 0807)  Pulse: 95 (03/02/19 0812)  Resp: 18 (03/02/19 0812)  BP: 130/61 (03/02/19 0807)  SpO2: 97 % (03/02/19 0812) Vital Signs (24h Range):  Temp:  [96.1 °F (35.6 °C)-97.7 °F (36.5 °C)] 96.1 °F (35.6 °C)  Pulse:  [93-97] 95  Resp:  [18] 18  SpO2:  [96 %-97 %] 97 %  BP: (129-130)/(60-61) 130/61     Weight: 71 kg (156 lb 8.4 oz)  Body mass index is 23.8 kg/m².    Intake/Output Summary (Last 24 hours) at 3/2/2019 2030  Last data filed at 3/2/2019 1800  Gross per 24 hour   Intake 2612.5 ml   Output --   Net 2612.5 ml "      Physical Exam   Constitutional: No distress.   HENT:   Mouth/Throat: No oropharyngeal exudate.   Eyes: Right eye exhibits no discharge. Left eye exhibits no discharge.   Neck: No JVD present. Thyromegaly present.   Cardiovascular: Normal rate and regular rhythm.   Pulmonary/Chest: Effort normal and breath sounds normal.   Abdominal: Soft. He exhibits no distension. There is no tenderness.   Musculoskeletal: He exhibits no edema.   Neurological: He is alert.   Skin: Skin is warm and dry. No rash noted. He is not diaphoretic.   Psychiatric: His behavior is normal.       Significant Labs: All pertinent labs within the past 24 hours have been reviewed.    Significant Imaging: I have reviewed all pertinent imaging results/findings within the past 24 hours.    Assessment/Plan:      * Thyrotoxicosis with thyrotoxic crisis     Patient with evidence of thyrotoxicosis and  Impending thyroid storm.  Will continue patient on PTU, propranolol, glucocorticoids, and monitor patient closely.  No indication for SSKI currently.  Thyroid US is consistent with thyroiditis.  There is a nodule.  Will need uptake nuclear scan.    Lab Results   Component Value Date    TSH <0.010 (L) 03/01/2019    FREET4 4.46 (H) 03/02/2019          Malnutrition of moderate degree     Patient was severe weight loss of approximately 50 lb.  Will consult Nutrition, and encourage patient p.o. Intake.  Likely etiology is related to hyperthyroidism.       Hypercalcemia    May be due to thyrotoxicosis.  Monitor Ca while getting treatment.  ]  Calcium   Date Value Ref Range Status   03/02/2019 11.1 (H) 8.7 - 10.5 mg/dL Final            VTE Risk Mitigation (From admission, onward)        Ordered     IP VTE LOW RISK PATIENT  Once      03/01/19 1947     Place JENNY hose  Until discontinued      03/01/19 1947              Russ Gaffney MD  Department of Hospital Medicine   [facility]

## 2019-03-03 NOTE — PROGRESS NOTES
03/03/19 0820   Patient Assessment/Suction   Level of Consciousness (AVPU) alert   PRE-TX-O2   O2 Device (Oxygen Therapy) room air   SpO2 95 %   Pulse Oximetry Type Intermittent   $ Pulse Oximetry - Multiple Charge Pulse Oximetry - Multiple   Pulse 108   Resp 18

## 2019-03-03 NOTE — SUBJECTIVE & OBJECTIVE
Interval History:  Pulse less than 100.  Much fewer muscle tremors.  No SOB.    Review of Systems   Constitutional: Negative for fatigue and fever.   Respiratory: Negative for cough and shortness of breath.    Cardiovascular: Negative for chest pain.   Gastrointestinal: Negative for abdominal pain.     Objective:     Vital Signs (Most Recent):  Temp: 96.1 °F (35.6 °C) (03/02/19 0807)  Pulse: 95 (03/02/19 0812)  Resp: 18 (03/02/19 0812)  BP: 130/61 (03/02/19 0807)  SpO2: 97 % (03/02/19 0812) Vital Signs (24h Range):  Temp:  [96.1 °F (35.6 °C)-97.7 °F (36.5 °C)] 96.1 °F (35.6 °C)  Pulse:  [93-97] 95  Resp:  [18] 18  SpO2:  [96 %-97 %] 97 %  BP: (129-130)/(60-61) 130/61     Weight: 71 kg (156 lb 8.4 oz)  Body mass index is 23.8 kg/m².    Intake/Output Summary (Last 24 hours) at 3/2/2019 2030  Last data filed at 3/2/2019 1800  Gross per 24 hour   Intake 2612.5 ml   Output --   Net 2612.5 ml      Physical Exam   Constitutional: No distress.   HENT:   Mouth/Throat: No oropharyngeal exudate.   Eyes: Right eye exhibits no discharge. Left eye exhibits no discharge.   Neck: No JVD present. Thyromegaly present.   Cardiovascular: Normal rate and regular rhythm.   Pulmonary/Chest: Effort normal and breath sounds normal.   Abdominal: Soft. He exhibits no distension. There is no tenderness.   Musculoskeletal: He exhibits no edema.   Neurological: He is alert.   Skin: Skin is warm and dry. No rash noted. He is not diaphoretic.   Psychiatric: His behavior is normal.       Significant Labs: All pertinent labs within the past 24 hours have been reviewed.    Significant Imaging: I have reviewed all pertinent imaging results/findings within the past 24 hours.

## 2019-03-03 NOTE — ASSESSMENT & PLAN NOTE
Patient with evidence of thyrotoxicosis and  Impending thyroid storm.  Will continue patient on PTU, propranolol, glucocorticoids, and monitor patient closely.  No indication for SSKI currently.  Thyroid US is consistent with thyroiditis.  There is a nodule.  Will need uptake nuclear scan.    Lab Results   Component Value Date    TSH <0.010 (L) 03/01/2019    FREET4 4.46 (H) 03/02/2019

## 2019-03-04 PROBLEM — E83.52 HYPERCALCEMIA: Status: RESOLVED | Noted: 2019-03-01 | Resolved: 2019-03-04

## 2019-03-04 PROBLEM — E05.90 HYPERTHYROIDISM: Status: ACTIVE | Noted: 2019-03-04

## 2019-03-04 PROBLEM — E05.91 THYROTOXICOSIS WITH THYROTOXIC CRISIS: Status: RESOLVED | Noted: 2019-03-01 | Resolved: 2019-03-04

## 2019-03-04 LAB — T3 SERPL-MCNC: 241 NG/DL

## 2019-03-04 PROCEDURE — 25000003 PHARM REV CODE 250: Performed by: HOSPITALIST

## 2019-03-04 PROCEDURE — S4991 NICOTINE PATCH NONLEGEND: HCPCS | Performed by: HOSPITALIST

## 2019-03-04 PROCEDURE — 97802 MEDICAL NUTRITION INDIV IN: CPT

## 2019-03-04 PROCEDURE — 84480 ASSAY TRIIODOTHYRONINE (T3): CPT

## 2019-03-04 PROCEDURE — 11000001 HC ACUTE MED/SURG PRIVATE ROOM

## 2019-03-04 PROCEDURE — 36415 COLL VENOUS BLD VENIPUNCTURE: CPT

## 2019-03-04 PROCEDURE — 94761 N-INVAS EAR/PLS OXIMETRY MLT: CPT

## 2019-03-04 PROCEDURE — 63600175 PHARM REV CODE 636 W HCPCS: Performed by: HOSPITALIST

## 2019-03-04 RX ORDER — PROPYLTHIOURACIL 50 MG/1
100 TABLET ORAL 3 TIMES DAILY
Qty: 180 TABLET | Refills: 2 | Status: SHIPPED | OUTPATIENT
Start: 2019-03-04 | End: 2019-06-02

## 2019-03-04 RX ORDER — DIAZEPAM 5 MG/1
5 TABLET ORAL NIGHTLY PRN
Qty: 15 TABLET | Refills: 0 | Status: SHIPPED | OUTPATIENT
Start: 2019-03-04

## 2019-03-04 RX ORDER — ATENOLOL 50 MG/1
50 TABLET ORAL DAILY
Qty: 30 TABLET | Refills: 1 | Status: SHIPPED | OUTPATIENT
Start: 2019-03-04 | End: 2019-05-03

## 2019-03-04 RX ADMIN — HYDROCORTISONE SODIUM SUCCINATE 50 MG: 100 INJECTION, POWDER, FOR SOLUTION INTRAMUSCULAR; INTRAVENOUS at 05:03

## 2019-03-04 RX ADMIN — PROPRANOLOL HYDROCHLORIDE 20 MG: 20 TABLET ORAL at 06:03

## 2019-03-04 RX ADMIN — HYDROCORTISONE SODIUM SUCCINATE 50 MG: 100 INJECTION, POWDER, FOR SOLUTION INTRAMUSCULAR; INTRAVENOUS at 08:03

## 2019-03-04 RX ADMIN — PROPRANOLOL HYDROCHLORIDE 20 MG: 20 TABLET ORAL at 11:03

## 2019-03-04 RX ADMIN — DIAZEPAM 10 MG: 5 TABLET ORAL at 08:03

## 2019-03-04 RX ADMIN — PROPYLTHIOURACIL 100 MG: 50 TABLET ORAL at 11:03

## 2019-03-04 RX ADMIN — PROPYLTHIOURACIL 100 MG: 50 TABLET ORAL at 06:03

## 2019-03-04 RX ADMIN — NICOTINE 1 PATCH: 7 PATCH, EXTENDED RELEASE TRANSDERMAL at 08:03

## 2019-03-04 RX ADMIN — HYDROCORTISONE SODIUM SUCCINATE 50 MG: 100 INJECTION, POWDER, FOR SOLUTION INTRAMUSCULAR; INTRAVENOUS at 01:03

## 2019-03-04 RX ADMIN — PROPRANOLOL HYDROCHLORIDE 20 MG: 20 TABLET ORAL at 05:03

## 2019-03-04 RX ADMIN — PROPYLTHIOURACIL 100 MG: 50 TABLET ORAL at 01:03

## 2019-03-04 NOTE — PLAN OF CARE
Patient will need endocrinology appointment for follow up care. Spoke with patient and spouse at bedside.  Patient reports has not been established with PCP. Spouse will bring medicaid card- (card has medicaid's assigned PCP).   Contacted Dr. Al [phone]- office staff evaluating if able to accept [place] medicaid- provided office with contact information for call return.   .     Referral form for [facility] centers filled out and faxed to [phone] and [phone] (including demogra  phics, and required paperwork). Fax confirmation received. Notified patient that referral sent to [facility]. Patient verbalized understanding.        03/04/19 7808   Discharge Assessment   Assessment Type Discharge Planning Reassessment

## 2019-03-04 NOTE — ASSESSMENT & PLAN NOTE
May be due to thyrotoxicosis.  Monitor Ca while getting treatment.  It's improving.  ]  Calcium   Date Value Ref Range Status   03/03/2019 10.2 8.7 - 10.5 mg/dL Final

## 2019-03-04 NOTE — SUBJECTIVE & OBJECTIVE
Interval History:  Pulse is around 100.  He has a few pauses on telemetry.  No new symptoms.    Review of Systems   Constitutional: Negative for fatigue and fever.   Respiratory: Negative for cough and shortness of breath.    Cardiovascular: Negative for chest pain.   Gastrointestinal: Negative for abdominal pain.     Objective:     Vital Signs (Most Recent):  Temp: 97.6 °F (36.4 °C) (03/03/19 1953)  Pulse: 101 (03/03/19 1953)  Resp: 18 (03/03/19 1953)  BP: (!) 158/72 (03/03/19 1953)  SpO2: (!) 94 % (03/03/19 1953) Vital Signs (24h Range):  Temp:  [97 °F (36.1 °C)-98.5 °F (36.9 °C)] 97.6 °F (36.4 °C)  Pulse:  [] 101  Resp:  [14-18] 18  SpO2:  [94 %-98 %] 94 %  BP: (142-162)/(72-85) 158/72     Weight: 71 kg (156 lb 8.4 oz)  Body mass index is 23.8 kg/m².    Intake/Output Summary (Last 24 hours) at 3/3/2019 2139  Last data filed at 3/3/2019 1900  Gross per 24 hour   Intake 360 ml   Output --   Net 360 ml      Physical Exam   Constitutional: No distress.   HENT:   Mouth/Throat: No oropharyngeal exudate.   Eyes: Right eye exhibits no discharge. Left eye exhibits no discharge.   Neck: No JVD present. Thyromegaly (non-tender) present.   Cardiovascular: Normal rate and regular rhythm.   Pulmonary/Chest: Effort normal and breath sounds normal.   Abdominal: Soft. He exhibits no distension. There is no tenderness.   Musculoskeletal: He exhibits no edema.   Neurological: He is alert.   Skin: Skin is warm and dry. No rash noted. He is not diaphoretic.   Psychiatric: His behavior is normal.       Significant Labs: All pertinent labs within the past 24 hours have been reviewed.    Significant Imaging: I have reviewed all pertinent imaging results/findings within the past 24 hours.

## 2019-03-04 NOTE — NURSING
Nuclear med called asking if pt have been taking any thyroid medication, informed him of the medication he has been taking. Nuclear med states the test cannot be completed if he been taking thyroid medication, needs to be off for at least a week, but he will double check with radiologist on the certain type of thyroid medication that can or cannot be given for this test.

## 2019-03-04 NOTE — PLAN OF CARE
Problem: Adult Inpatient Plan of Care  Goal: Plan of Care Review  Outcome: Ongoing (interventions implemented as appropriate)  POC reviewed with pt, pt verbalized understanding. Pt requested a Biopsy, FNA biopsy scheduled for 3/6/19. Safety maintained. NAD. Sinus rhythm. Will continue to monitor.

## 2019-03-04 NOTE — PROGRESS NOTES
03/04/19 0806   Patient Assessment/Suction   Level of Consciousness (AVPU) alert   PRE-TX-O2   O2 Device (Oxygen Therapy) room air   SpO2 95 %   Pulse Oximetry Type Intermittent   $ Pulse Oximetry - Multiple Charge Pulse Oximetry - Multiple   Pulse 90   Resp 18

## 2019-03-04 NOTE — PROGRESS NOTES
"[facility] Medicine  Progress Note    Patient Name: Jeffrey Chavez  MRN: [id number]  Patient Class: IP- Inpatient   Admission Date: 3/1/2019  Length of Stay: 2 days  Attending Physician: Russ Gaffney MD  Primary Care Provider: Primary Doctor No        Subjective:     Principal Problem:Thyrotoxicosis with thyrotoxic crisis    HPI:  Patient is a 33-year-old with no past medical history who presents the hospital with a 50 lb weight loss over the past 2 months, as well as severe fatigue, tremor and difficulty sleeping.  Patient denies fever, night sweats, or other B symptoms.  He has had intermittent diarrhea and cough, but denies any other symptoms.  Patient states that he was trying to "tough it out "but on the day of admission he started developing shortness of breath and that scared him to come to the hospital.  In the emergency department, patient was found to be tachycardic, tachypneic, and with a fine tremor.  Labs indicate evidence of   Thyrotoxicosis.    Hospital Course:  No notes on file    Interval History:  Pulse is around 100.  He has a few pauses on telemetry.  No new symptoms.    Review of Systems   Constitutional: Negative for fatigue and fever.   Respiratory: Negative for cough and shortness of breath.    Cardiovascular: Negative for chest pain.   Gastrointestinal: Negative for abdominal pain.     Objective:     Vital Signs (Most Recent):  Temp: 97.6 °F (36.4 °C) (03/03/19 1953)  Pulse: 101 (03/03/19 1953)  Resp: 18 (03/03/19 1953)  BP: (!) 158/72 (03/03/19 1953)  SpO2: (!) 94 % (03/03/19 1953) Vital Signs (24h Range):  Temp:  [97 °F (36.1 °C)-98.5 °F (36.9 °C)] 97.6 °F (36.4 °C)  Pulse:  [] 101  Resp:  [14-18] 18  SpO2:  [94 %-98 %] 94 %  BP: (142-162)/(72-85) 158/72     Weight: 71 kg (156 lb 8.4 oz)  Body mass index is 23.8 kg/m².    Intake/Output Summary (Last 24 hours) at 3/3/2019 2134  Last data filed at 3/3/2019 1900  Gross per 24 hour   Intake 360 ml "   Output --   Net 360 ml      Physical Exam   Constitutional: No distress.   HENT:   Mouth/Throat: No oropharyngeal exudate.   Eyes: Right eye exhibits no discharge. Left eye exhibits no discharge.   Neck: No JVD present. Thyromegaly (non-tender) present.   Cardiovascular: Normal rate and regular rhythm.   Pulmonary/Chest: Effort normal and breath sounds normal.   Abdominal: Soft. He exhibits no distension. There is no tenderness.   Musculoskeletal: He exhibits no edema.   Neurological: He is alert.   Skin: Skin is warm and dry. No rash noted. He is not diaphoretic.   Psychiatric: His behavior is normal.       Significant Labs: All pertinent labs within the past 24 hours have been reviewed.    Significant Imaging: I have reviewed all pertinent imaging results/findings within the past 24 hours.    Assessment/Plan:      * Thyrotoxicosis with thyrotoxic crisis     Patient with evidence of thyrotoxicosis.  Has greatly improved..  Will continue patient on PTU, propranolol, glucocorticoids, and monitor patient closely.  No indication for SSKI currently.  Thyroid US is consistent with thyroiditis.  There is a nodule.  Will need uptake nuclear scan.  Will get thyrotropin receptor antibody to check for Grave's.  Will reduce PTU to 100 mg Q8.  Will reduce propranolol.  Will reduce the steroid.  Get another T3 level tomorrow.    Lab Results   Component Value Date    TSH <0.010 (L) 03/01/2019    FREET4 4.46 (H) 03/02/2019          Malnutrition of moderate degree     Patient was severe weight loss of approximately 50 lb.  Will consult Nutrition, and encourage patient p.o. Intake.  Likely etiology is related to hyperthyroidism.       Hypercalcemia    May be due to thyrotoxicosis.  Monitor Ca while getting treatment.  It's improving.  ]  Calcium   Date Value Ref Range Status   03/03/2019 10.2 8.7 - 10.5 mg/dL Final            VTE Risk Mitigation (From admission, onward)        Ordered     IP VTE LOW RISK PATIENT  Once       03/01/19 1947     Place JENNY hose  Until discontinued      03/01/19 1947              Russ Gaffney MD  Department of Hospital Medicine   [facility]

## 2019-03-04 NOTE — ASSESSMENT & PLAN NOTE
Patient with evidence of thyrotoxicosis.  Has greatly improved..  Will continue patient on PTU, propranolol, glucocorticoids, and monitor patient closely.  No indication for SSKI currently.  Thyroid US is consistent with thyroiditis.  There is a nodule.  Will need uptake nuclear scan.  Will get thyrotropin receptor antibody to check for Grave's.  Will reduce PTU to 100 mg Q8.  Will reduce propranolol.  Will reduce the steroid.  Get another T3 level tomorrow.    Lab Results   Component Value Date    TSH <0.010 (L) 03/01/2019    FREET4 4.46 (H) 03/02/2019

## 2019-03-04 NOTE — PLAN OF CARE
Problem: Adult Inpatient Plan of Care  Goal: Patient-Specific Goal (Individualization)  Intervention: General healthful diet, Nutrition education    Recommendations: 1) recommend general healthy diet 2) add Boost Glucose Control, bid and discourage chocolate milk 3) weight checks at least wkly  Goals: 1) PO intake >=85% EEN during admit 2) Pt will receive and use ons by f/u  Nutrition Goal Status: new  Communication of RD Recs: (POC, sticky note)

## 2019-03-04 NOTE — PROGRESS NOTES
"[facility]  Adult Nutrition  Progress Note    SUMMARY   Intervention: General healthful diet, Nutrition education    Recommendations: 1) recommend general healthy diet 2) add Boost Glucose Control, bid and discourage chocolate milk 3) weight checks at least wkly  Goals: 1) PO intake >=85% EEN during admit 2) Pt will receive and use ons by f/u  Nutrition Goal Status: new  Communication of RD Recs: (POC, sticky note)    Reason for Assessment    Reason For Assessment: consult  Diagnosis: (thyrotoxicosis)  Relevant Medical History: No pertinent medical hx.    Interdisciplinary Rounds: did not attend  General Information Comments: Pt alert. Reports good appetite, eats small amounts through the day. Weight loss of 50 lbs x 2 months per pt report. No wt hx <=yr in chart. In 2015 pt weight 74.5 kg (165 lbs, 10 oz).  NFPE completed. Educated pt re calories/nutrients to stop weight loss.    Nutrition Discharge Planning: regular diet + Boost Glucose Control type nutrition supplements as needed    Nutrition Risk Screen    Nutrition Risk Screen: no indicators present    Nutrition/Diet History    Patient Reported Diet/Restrictions/Preferences: general  Typical Food/Fluid Intake: Girlfriend shops/cooks. Eats 3 meals daily. Snacks on honeybuns, Cheez ivette, sweet tea, Krishna Florian chocolate drinks  Food Preferences: protein foods, as above. States he would like to eat more fruit.   Spiritual, Cultural Beliefs, Holiness Practices, Values that Affect Care: no  Supplemental Drinks or Food Habits: (none)  Food Allergies: NKFA(or intolerance)  Factors Affecting Nutritional Intake: None identified at this time    Anthropometrics    Temp: 97.4 °F (36.3 °C)  Height Method: Stated  Height: 5' 8"  Height (inches): 68 in  Weight Method: Bed Scale  Weight: 71 kg (156 lb 8.4 oz)  Weight (lb): 156.53 lb  Ideal Body Weight (IBW), Male: 154 lb  % Ideal Body Weight, Male (lb): 101.64 lb  BMI (Calculated): 23.8  BMI Grade: 18.5-24.9 - " normal  Weight Loss: (per patient report;  Last wt in chart 2/25/15, 74.8 kg)  Usual Body Weight (UBW), k.91 kg  % Usual Body Weight: 78.26  % Weight Change From Usual Weight: -21.9 %       Lab/Procedures/Meds    Pertinent Labs Reviewed: reviewed  Lab Results   Component Value Date    HGBA1C 5.5 2019     POCT Glucose   Date Value Ref Range Status   2019 162 (H) 70 - 110 mg/dL Final   2019 108 70 - 110 mg/dL Final     Lab Results   Component Value Date    ALBUMIN 3.1 (L) 2019     Pertinent Medications Reviewed: reviewed  Pertinent Medications Comments: IVF @ 75 mls/hr, senna    Physical Findings/Assessment         Estimated/Assessed Needs    Weight Used For Calorie Calculations: 71 kg (156 lb 8.4 oz)  Energy Calorie Requirements (kcal): 2113 (1.2 to prevent further wt loss)  Energy Need Method: [place]-St Jeor  Protein Requirements: 64-78 (0.9-1.1 g/kg 2/2 muscle losses)  Weight Used For Protein Calculations: 71 kg (156 lb 8.4 oz)     Estimated Fluid Requirement Method: RDA Method(or per MD)  RDA Method (mL): 3  CHO Requirement: n/a Monitor blood sugars and if elevated transition to DM diet. Current A1c 5.5      Nutrition Prescription Ordered    Current Diet Order: regular    Evaluation of Received Nutrient/Fluid Intake    Energy Calories Required: meeting needs  Protein Required: meeting needs  Fluid Required: meeting needs  Comments: Meeting needs in hospital with 100% intake most meals, multiple snacks in room including large empty chocolate milk bottle, cheez ivette, sweet tea in bottle  Tolerance: tolerating  % Intake of Estimated Energy Needs: >100%  % Meal Intake: 75 - 100 %    Nutrition Risk    Level of Risk/Frequency of Follow-up: (1 x wkly)     Assessment and Plan     Poor nutrition quality of life r/t food choices of high calorie, low nutrient value as evidenced by pt self reported diet hx.  (see above)    Monitor and Evaluation    Food and Nutrient Intake: energy intake  Food  and Nutrient Adminstration: diet order  Knowledge/Beliefs/Attitudes: food and nutrition knowledge/skill  Biochemical Data, Medical Tests and Procedures: glucose/endocrine profile(tsh)  Nutrition-Focused Physical Findings: overall appearance, skin     Malnutrition Assessment     Skin (Micronutrient): (Manpreet score 22)   Micronutrient Evaluation: suspected deficiency  Micronutrient Evaluation Comments: 2/2 high sugar content of home diet per pt's reported diet hx   Weight Loss (Malnutrition): (Weight loss is significant per pt report.  No documented weights in chart from acute care or from office visit to support. )  Hand  Strength, Left (Malnutrition): (Ability to perform 8 of 8 ADLs no assistance needed, per chart review)       [place] Region (Muscle Loss): mild depletion  Clavicle Bone Region (Muscle Loss): severe depletion  Clavicle and Acromion Bone Region (Muscle Loss): moderate depletion  Dorsal Hand (Muscle Loss): mild depletion  Patellar Region (Muscle Loss): mild depletion  Posterior Calf Region (Muscle Loss): mild depletion       Subcutaneous Fat Loss (Final Summary): mild protein-calorie malnutrition  Muscle Loss Evaluation (Final Summary): moderate protein-calorie malnutrition         Nutrition Follow-Up  yes

## 2019-03-04 NOTE — PLAN OF CARE
Problem: Adult Inpatient Plan of Care  Goal: Plan of Care Review  Outcome: Ongoing (interventions implemented as appropriate)  Pt. Rested well during the night with family at the bedside. NM thyroid scan might not be able to be done until Wednesday or Thursday. Ambulates independently to the restroom. IV fluids going at 75 mL/hr. Daily wt. Labs monitor. Will continue to monitor.

## 2019-03-05 LAB
TSH RECEP AB SER-ACNC: 26 IU/L
TSI SER-ACNC: >40 IU/L

## 2019-03-05 PROCEDURE — 25000003 PHARM REV CODE 250: Performed by: HOSPITALIST

## 2019-03-05 PROCEDURE — 63600175 PHARM REV CODE 636 W HCPCS: Performed by: HOSPITALIST

## 2019-03-05 PROCEDURE — 11000001 HC ACUTE MED/SURG PRIVATE ROOM

## 2019-03-05 PROCEDURE — 94761 N-INVAS EAR/PLS OXIMETRY MLT: CPT

## 2019-03-05 PROCEDURE — S4991 NICOTINE PATCH NONLEGEND: HCPCS | Performed by: HOSPITALIST

## 2019-03-05 RX ADMIN — PROPYLTHIOURACIL 100 MG: 50 TABLET ORAL at 10:03

## 2019-03-05 RX ADMIN — PROPYLTHIOURACIL 100 MG: 50 TABLET ORAL at 02:03

## 2019-03-05 RX ADMIN — NICOTINE 1 PATCH: 7 PATCH, EXTENDED RELEASE TRANSDERMAL at 08:03

## 2019-03-05 RX ADMIN — DIAZEPAM 10 MG: 5 TABLET ORAL at 10:03

## 2019-03-05 RX ADMIN — PROPRANOLOL HYDROCHLORIDE 20 MG: 20 TABLET ORAL at 05:03

## 2019-03-05 RX ADMIN — HYDROCORTISONE SODIUM SUCCINATE 50 MG: 100 INJECTION, POWDER, FOR SOLUTION INTRAMUSCULAR; INTRAVENOUS at 08:03

## 2019-03-05 RX ADMIN — PROPYLTHIOURACIL 100 MG: 50 TABLET ORAL at 05:03

## 2019-03-05 RX ADMIN — HYDROCORTISONE SODIUM SUCCINATE 50 MG: 100 INJECTION, POWDER, FOR SOLUTION INTRAMUSCULAR; INTRAVENOUS at 12:03

## 2019-03-05 RX ADMIN — HYDROCORTISONE SODIUM SUCCINATE 50 MG: 100 INJECTION, POWDER, FOR SOLUTION INTRAMUSCULAR; INTRAVENOUS at 05:03

## 2019-03-05 RX ADMIN — PROPRANOLOL HYDROCHLORIDE 20 MG: 20 TABLET ORAL at 11:03

## 2019-03-05 RX ADMIN — DOCUSATE SODIUM AND SENNOSIDES 1 TABLET: 8.6; 5 TABLET, FILM COATED ORAL at 08:03

## 2019-03-05 NOTE — PROGRESS NOTES
"[facility] Hospital Medicine  Progress Note    Patient Name: Jeffrey Chavez  MRN: [id number]  Patient Class: IP- Inpatient   Admission Date: 3/1/2019  Length of Stay: 3 days  Attending Physician: Russ Gaffney MD  Primary Care Provider: Primary Doctor No        Subjective:     Principal Problem:Hyperthyroidism    HPI:  Patient is a 33-year-old with no past medical history who presents the hospital with a 50 lb weight loss over the past 2 months, as well as severe fatigue, tremor and difficulty sleeping.  Patient denies fever, night sweats, or other B symptoms.  He has had intermittent diarrhea and cough, but denies any other symptoms.  Patient states that he was trying to "tough it out "but on the day of admission he started developing shortness of breath and that scared him to come to the hospital.  In the emergency department, patient was found to be tachycardic, tachypneic, and with a fine tremor.  Labs indicate evidence of   Thyrotoxicosis.    Hospital Course:  No notes on file    Interval History:  No new symptoms.  Doing well.    Review of Systems   Constitutional: Negative for fatigue and fever.   Respiratory: Negative for cough and shortness of breath.    Cardiovascular: Negative for chest pain.   Gastrointestinal: Negative for abdominal pain.     Objective:     Vital Signs (Most Recent):  Temp: 96.1 °F (35.6 °C) (03/04/19 1928)  Pulse: 90 (03/04/19 1928)  Resp: 18 (03/04/19 1928)  BP: (!) 162/82 (03/04/19 1928)  SpO2: 95 % (03/04/19 1928) Vital Signs (24h Range):  Temp:  [96.1 °F (35.6 °C)-98.2 °F (36.8 °C)] 96.1 °F (35.6 °C)  Pulse:  [89-94] 90  Resp:  [16-18] 18  SpO2:  [94 %-98 %] 95 %  BP: (138-162)/(63-82) 162/82     Weight: 71 kg (156 lb 8.4 oz)  Body mass index is 23.8 kg/m².    Intake/Output Summary (Last 24 hours) at 3/4/2019 2107  Last data filed at 3/4/2019 1800  Gross per 24 hour   Intake 6753.75 ml   Output --   Net 6753.75 ml      Physical Exam   Constitutional: No " distress.   HENT:   Mouth/Throat: No oropharyngeal exudate.   Eyes: Right eye exhibits no discharge. Left eye exhibits no discharge.   Neck: No JVD present. Thyromegaly (non-tender) present.   Cardiovascular: Normal rate and regular rhythm.   Pulmonary/Chest: Effort normal and breath sounds normal.   Abdominal: Soft. He exhibits no distension. There is no tenderness.   Musculoskeletal: He exhibits no edema.   Neurological: He is alert.   Skin: Skin is warm and dry. No rash noted. He is not diaphoretic.   Psychiatric: His behavior is normal.       Significant Labs: All pertinent labs within the past 24 hours have been reviewed.    Significant Imaging: I have reviewed all pertinent imaging results/findings within the past 24 hours.    Assessment/Plan:      * Hyperthyroidism, probably Grave's Disease    No longer has thyrotoxicosis.  Thyrotropin receptor antibody drawn and sent to lab; waiting on results.  Reduced dose of PTU.  PTU will affect results of thyroid uptake scan, so that test will have to wait.  Continue BB.  Switch to atenolol on discharge.   [facility] for [facility] follow-[facility]; it may take months to get appointment.  Pt's spouse concerned about the nodule in the thyroid and asking for biopsy.  I think it's reasonable to get FNA while he's admitted.  Radiologist won't have time to do it until Wednesday.         Malnutrition of moderate degree     Patient was severe weight loss of approximately 50 lb.  Will consult Nutrition, and encourage patient p.o. Intake.  Likely etiology is related to hyperthyroidism.         VTE Risk Mitigation (From admission, onward)        Ordered     IP VTE LOW RISK PATIENT  Once      03/01/19 1947     Place JENNY hose  Until discontinued      03/01/19 1947              Russ Gaffney MD  Department of Hospital Medicine   [facility]

## 2019-03-05 NOTE — PT/OT/SLP DISCHARGE
Occupational Therapy Discharge Summary    Jeffrey Chavez  MRN: [id number]   Principal Problem: Hyperthyroidism      Patient Discharged from acute Occupational Therapy on 3-5-19.      Assessment:      No OT/PT needs indicated.    Objective:     GOALS:   Multidisciplinary Problems     Occupational Therapy Goals     Not on file                Reasons for Discontinuation of Therapy Services  no need      Plan:     Patient Discharged to: In house with hospital services.    SANDRO Langley  3/5/2019

## 2019-03-05 NOTE — ASSESSMENT & PLAN NOTE
No longer has thyrotoxicosis.  Thyrotropin receptor antibody drawn and sent to lab; waiting on results.  Reduced dose of PTU.  PTU will affect results of thyroid uptake scan, so that test will have to wait.  Continue BB.  Switch to atenolol on discharge.   made referral to [facility] network for endocrinology follow-up; it may take months to get appointment.  Pt's spouse concerned about the nodule in the thyroid and asking for biopsy.  I think it's reasonable to get FNA while he's admitted.  Radiologist won't have time to do it until Wednesday.

## 2019-03-05 NOTE — PLAN OF CARE
Problem: Adult Inpatient Plan of Care  Goal: Plan of Care Review  Outcome: Ongoing (interventions implemented as appropriate)  Fall and safety precautions maintained. VSS. Tolerating IV steroids well. Plan is for thyroid biopsy in AM. Side rails up x2, call light in reach. Will continue to monitor.

## 2019-03-05 NOTE — PLAN OF CARE
Problem: Adult Inpatient Plan of Care  Goal: Plan of Care Review  Outcome: Ongoing (interventions implemented as appropriate)  Pt. Rested well during the night. Biopsy might not be able to be done until Wednesday. Ambulates independently to the restroom. Daily wt. Labs monitor. Will continue to monitor.

## 2019-03-05 NOTE — SUBJECTIVE & OBJECTIVE
Interval History:  No new symptoms.  Doing well.    Review of Systems   Constitutional: Negative for fatigue and fever.   Respiratory: Negative for cough and shortness of breath.    Cardiovascular: Negative for chest pain.   Gastrointestinal: Negative for abdominal pain.     Objective:     Vital Signs (Most Recent):  Temp: 96.1 °F (35.6 °C) (03/04/19 1928)  Pulse: 90 (03/04/19 1928)  Resp: 18 (03/04/19 1928)  BP: (!) 162/82 (03/04/19 1928)  SpO2: 95 % (03/04/19 1928) Vital Signs (24h Range):  Temp:  [96.1 °F (35.6 °C)-98.2 °F (36.8 °C)] 96.1 °F (35.6 °C)  Pulse:  [89-94] 90  Resp:  [16-18] 18  SpO2:  [94 %-98 %] 95 %  BP: (138-162)/(63-82) 162/82     Weight: 71 kg (156 lb 8.4 oz)  Body mass index is 23.8 kg/m².    Intake/Output Summary (Last 24 hours) at 3/4/2019 2107  Last data filed at 3/4/2019 1800  Gross per 24 hour   Intake 6753.75 ml   Output --   Net 6753.75 ml      Physical Exam   Constitutional: No distress.   HENT:   Mouth/Throat: No oropharyngeal exudate.   Eyes: Right eye exhibits no discharge. Left eye exhibits no discharge.   Neck: No JVD present. Thyromegaly (non-tender) present.   Cardiovascular: Normal rate and regular rhythm.   Pulmonary/Chest: Effort normal and breath sounds normal.   Abdominal: Soft. He exhibits no distension. There is no tenderness.   Musculoskeletal: He exhibits no edema.   Neurological: He is alert.   Skin: Skin is warm and dry. No rash noted. He is not diaphoretic.   Psychiatric: His behavior is normal.       Significant Labs: All pertinent labs within the past 24 hours have been reviewed.    Significant Imaging: I have reviewed all pertinent imaging results/findings within the past 24 hours.

## 2019-03-06 VITALS
DIASTOLIC BLOOD PRESSURE: 60 MMHG | TEMPERATURE: 97 F | OXYGEN SATURATION: 98 % | SYSTOLIC BLOOD PRESSURE: 124 MMHG | RESPIRATION RATE: 18 BRPM | HEART RATE: 79 BPM | WEIGHT: 153.69 LBS | BODY MASS INDEX: 23.29 KG/M2 | HEIGHT: 68 IN

## 2019-03-06 PROCEDURE — 88173 CYTOPATH EVAL FNA REPORT: CPT | Performed by: PATHOLOGY

## 2019-03-06 PROCEDURE — 94761 N-INVAS EAR/PLS OXIMETRY MLT: CPT

## 2019-03-06 PROCEDURE — 88173 CYTOPATH EVAL FNA REPORT: CPT | Mod: 26,,, | Performed by: PATHOLOGY

## 2019-03-06 PROCEDURE — 63600175 PHARM REV CODE 636 W HCPCS: Performed by: HOSPITALIST

## 2019-03-06 PROCEDURE — 25000003 PHARM REV CODE 250: Performed by: HOSPITALIST

## 2019-03-06 PROCEDURE — 88173 CYTOLOGY SPECIMEN-FNA NON-RADIOLOGY CLINICIAN PERFORMED W/O ON SITE: ICD-10-PCS | Mod: 26,,, | Performed by: PATHOLOGY

## 2019-03-06 RX ORDER — FERROUS SULFATE 325(65) MG
325 TABLET ORAL
Refills: 0 | COMMUNITY
Start: 2019-03-06

## 2019-03-06 RX ORDER — PREDNISONE 20 MG/1
TABLET ORAL
Qty: 15 TABLET | Refills: 0 | Status: SHIPPED | OUTPATIENT
Start: 2019-03-06 | End: 2019-03-06 | Stop reason: SDUPTHER

## 2019-03-06 RX ORDER — PREDNISONE 20 MG/1
TABLET ORAL
Qty: 15 TABLET | Refills: 0 | Status: SHIPPED | OUTPATIENT
Start: 2019-03-06

## 2019-03-06 RX ADMIN — PROPRANOLOL HYDROCHLORIDE 20 MG: 20 TABLET ORAL at 05:03

## 2019-03-06 RX ADMIN — PROPYLTHIOURACIL 100 MG: 50 TABLET ORAL at 01:03

## 2019-03-06 RX ADMIN — PROPRANOLOL HYDROCHLORIDE 20 MG: 20 TABLET ORAL at 11:03

## 2019-03-06 RX ADMIN — HYDROCORTISONE SODIUM SUCCINATE 50 MG: 100 INJECTION, POWDER, FOR SOLUTION INTRAMUSCULAR; INTRAVENOUS at 12:03

## 2019-03-06 RX ADMIN — PROPYLTHIOURACIL 100 MG: 50 TABLET ORAL at 05:03

## 2019-03-06 NOTE — PLAN OF CARE
03/06/19 1120   Final Note   Assessment Type Final Discharge Note   Anticipated Discharge Disposition Home

## 2019-03-06 NOTE — PLAN OF CARE
Problem: Adult Inpatient Plan of Care  Goal: Plan of Care Review  Outcome: Ongoing (interventions implemented as appropriate)  Bed is in low position, call light is within reach, SR up x 2, patient instructed to use call light for assistance, Thyroid biopsy this a.m. VSS, Cardiac monitored SR/ST, no complaints of pain, no sign of distress, patient is resting between care, will continue to monitor and round.

## 2019-03-06 NOTE — PROGRESS NOTES
"[facility] Hospital Medicine  Progress Note    Patient Name: Jeffrey Chavez  MRN: [id number]  Patient Class: IP- Inpatient   Admission Date: 3/1/2019  Length of Stay: 4 days  Attending Physician: Russ Gaffney MD  Primary Care Provider: Primary Doctor No        Subjective:     Principal Problem:Hyperthyroidism    HPI:  Patient is a 33-year-old with no past medical history who presents the hospital with a 50 lb weight loss over the past 2 months, as well as severe fatigue, tremor and difficulty sleeping.  Patient denies fever, night sweats, or other B symptoms.  He has had intermittent diarrhea and cough, but denies any other symptoms.  Patient states that he was trying to "tough it out "but on the day of admission he started developing shortness of breath and that scared him to come to the hospital.  In the emergency department, patient was found to be tachycardic, tachypneic, and with a fine tremor.  Labs indicate evidence of   Thyrotoxicosis.    Hospital Course:  No notes on file    Interval History:  No new symptoms.  Doing well.  Eager to get home.    Review of Systems   Constitutional: Negative for fatigue and fever.   Respiratory: Negative for cough and shortness of breath.    Cardiovascular: Negative for chest pain.   Gastrointestinal: Negative for abdominal pain.     Objective:     Vital Signs (Most Recent):  Temp: 97.7 °F (36.5 °C) (03/05/19 1507)  Pulse: 84 (03/05/19 1913)  Resp: 18 (03/05/19 1913)  BP: (!) 155/65 (03/05/19 1913)  SpO2: 99 % (03/05/19 1913) Vital Signs (24h Range):  Temp:  [97.7 °F (36.5 °C)-99 °F (37.2 °C)] 97.7 °F (36.5 °C)  Pulse:  [80-95] 84  Resp:  [14-18] 18  SpO2:  [95 %-99 %] 99 %  BP: (126-159)/(64-97) 155/65     Weight: 71 kg (156 lb 8.4 oz)  Body mass index is 23.8 kg/m².    Intake/Output Summary (Last 24 hours) at 3/5/2019 1956  Last data filed at 3/5/2019 1700  Gross per 24 hour   Intake 720 ml   Output --   Net 720 ml      Physical Exam "   Constitutional: No distress.   HENT:   Mouth/Throat: No oropharyngeal exudate.   Eyes: Right eye exhibits no discharge. Left eye exhibits no discharge.   Neck: No JVD present. Thyromegaly (non-tender) present.   Cardiovascular: Normal rate and regular rhythm.   Pulmonary/Chest: Effort normal and breath sounds normal.   Abdominal: Soft. He exhibits no distension. There is no tenderness.   Musculoskeletal: He exhibits no edema.   Neurological: He is alert.   Skin: Skin is warm and dry. No rash noted. He is not diaphoretic.   Psychiatric: His behavior is normal.       Significant Labs: All pertinent labs within the past 24 hours have been reviewed.    Significant Imaging: I have reviewed all pertinent imaging results/findings within the past 24 hours.    Assessment/Plan:      * Hyperthyroidism, probably Grave's Disease    No longer has thyrotoxicosis.  Thyrotropin receptor antibody drawn and sent to lab; waiting on results.  Reduced dose of PTU.  PTU will affect results of thyroid uptake scan, so that test will have to wait.  Continue BB.  Switch to atenolol on discharge.   made referral to [facility] network for endocrinology follow-up; it may take months to get appointment.  Pt's spouse concerned about the nodule in the thyroid and asking for biopsy.  I think it's reasonable to get FNA while he's admitted.  Radiologist won't have time to do it until Wednesday, which is tomorrow.         Malnutrition of moderate degree     Patient was severe weight loss of approximately 50 lb.  Will consult Nutrition, and encourage patient p.o. Intake.  Likely etiology is related to hyperthyroidism.         VTE Risk Mitigation (From admission, onward)        Ordered     IP VTE LOW RISK PATIENT  Once      03/01/19 1947     Place JENNY hose  Until discontinued      03/01/19 1947              Russ Gaffney MD  Department of Hospital Medicine   [facility]

## 2019-03-06 NOTE — HOSPITAL COURSE
Pt admitted with hyperthyroidism.  He has thyrotoxicosis.  Was started on large doses of PTU and also started on BB.  Condition improved.  PTU dose was lowered.  BB dose was adjusted.  Blood sample sent off for thyrotropin receptor antibody and results were positive   Thyroid US showed hyperemia and a nodule.  Pt had mild exophthalmos.  Graves Disease was a consideration.  Pt underwent biopsy of the nodule.-Pathology as follows  the above findings may represent an overactive (hot) nodule.PTU will affect results of thyroid uptake scan, so that test will have to wait.  Continue BB.  Switched to atenolol on discharge.   made referral to [facility] network for endocrinology follow-up; it may take months to get appointment  Meanwhile he is referred to fu at [facility].  Alert, nad. No exophthalmos on dc. RRR, no tachy,, l  Lungs clear. Neck -diffusely enlarged nontender thyroid noted. Ext -no edema

## 2019-03-06 NOTE — PLAN OF CARE
Sent an email to [facility] requesting an appt; they will contact patient.  Added appt to AVS.       03/06/19 1022   Discharge Reassessment   Assessment Type Discharge Planning Reassessment

## 2019-03-06 NOTE — NURSING
Pt transported off floor via WC at approximately 1020 for scheduled US guided biopsy and returned via WC at 1055.

## 2019-03-06 NOTE — MEDICAL/APP STUDENT
History of Presenting Illness:  Mr. Chavez, 34 y/o pleasant M with no significant past medical history, presented on 3/1/19 with headaches, abdominal pain, and 50-lb weight loss over 2 months. He also noted decreased energy, smaller appetite, and a depressed and anxious mood. The patient has been unable to work due to the onset of his symptoms. Denies any medication use.    Past Medical History:  History reviewed. No pertinent past medical history.    Past Surgical History:  History reviewed. No pertinent surgical history.    Social History:  Social History     Socioeconomic History    Marital status: Significant Other     Spouse name: Not on file    Number of children: Not on file    Years of education: Not on file    Highest education level: Not on file   Social Needs    Financial resource strain: Not on file    Food insecurity - worry: Not on file    Food insecurity - inability: Not on file    Transportation needs - medical: Not on file    Transportation needs - non-medical: Not on file   Occupational History    Not on file   Tobacco Use    Smoking status: Current Every Day Smoker     Packs/day: 0.50     Types: Cigarettes   Substance and Sexual Activity    Alcohol use: Yes     Comment: occ    Drug use: No    Sexual activity: Not on file   Other Topics Concern    Not on file   Social History Narrative    Not on file   The patient lives at home with his wife and 3 kids.    Family History:  The patient has a family history of Graves Disease in his paternal aunt, and DM and hypertension in his uncle.    Allergies:  Review of patient's allergies indicates:   Allergen Reactions    Ibuprofen Hives and Swelling       Medications:    Current Facility-Administered Medications:     acetaminophen tablet 1,000 mg, 1,000 mg, Oral, Q6H PRN, Adebayo Kincaid MD    dextrose 50% injection 12.5 g, 12.5 g, Intravenous, PRN, Adebayo Kincaid MD    dextrose 50% injection 25 g, 25 g, Intravenous, PRN, Adebayo Kincaid MD     diazePAM tablet 10 mg, 10 mg, Oral, Nightly PRN, Russ Gaffney MD, 10 mg at 03/05/19 2258    glucagon (human recombinant) injection 1 mg, 1 mg, Intramuscular, PRN, Adebayo Kincaid MD    glucose chewable tablet 16 g, 16 g, Oral, PRN, Adebayo Kincaid MD    glucose chewable tablet 24 g, 24 g, Oral, PRN, Adebayo Kincaid MD    hydrocortisone sodium succinate injection 50 mg, 50 mg, Intravenous, Q8H, Russ Gaffney MD, 50 mg at 03/06/19 0028    magnesium oxide tablet 800 mg, 800 mg, Oral, PRN, Adebayo Kincaid MD    magnesium oxide tablet 800 mg, 800 mg, Oral, PRN, Adebayo Kincaid MD    nicotine 7 mg/24 hr 1 patch, 1 patch, Transdermal, Daily, Russ Gaffney MD, 1 patch at 03/05/19 0815    ondansetron injection 8 mg, 8 mg, Intravenous, Q8H PRN, Adebayo Kincaid MD    potassium chloride 10% oral solution 40 mEq, 40 mEq, Oral, PRN, Adebayo Kincaid MD    propranolol tablet 20 mg, 20 mg, Oral, Q6H, Russ Gaffney MD, 20 mg at 03/06/19 0514    propylthiouracil tablet 100 mg, 100 mg, Oral, Q8H, Russ Gaffney MD, 100 mg at 03/06/19 0514    senna-docusate 8.6-50 mg per tablet 1 tablet, 1 tablet, Oral, BID, Adebayo Kincaid MD, 1 tablet at 03/05/19 0814    sodium chloride 0.9% flush 5 mL, 5 mL, Intravenous, PRN, Adebayo Kincaid MD    Review of Systems:  Review of Systems   Constitutional: Positive for malaise/fatigue and weight loss. Negative for chills, diaphoresis and fever.   HENT: Negative.  Negative for hearing loss.    Eyes: Negative.    Respiratory: Negative.    Cardiovascular: Positive for palpitations. Negative for chest pain, orthopnea, claudication and leg swelling.   Gastrointestinal: Positive for abdominal pain. Negative for blood in stool, constipation, nausea and vomiting.   Genitourinary: Positive for frequency. Negative for dysuria, hematuria and urgency.   Musculoskeletal: Negative.    Skin: Negative for itching and rash.   Neurological: Positive for tremors and headaches. Negative for  dizziness, tingling, sensory change, speech change, focal weakness, seizures, loss of consciousness and weakness.   Endo/Heme/Allergies: Negative.  Negative for polydipsia.   Psychiatric/Behavioral: Positive for depression. Negative for hallucinations, memory loss, substance abuse and suicidal ideas. The patient is nervous/anxious.         Physical Exam:  Physical Exam   Constitutional: He is oriented to person, place, and time. He appears well-developed.   HENT:   Head: Normocephalic.   Eyes: Pupils are equal, round, and reactive to light.   Neck: Thyromegaly present.   Cardiovascular: Normal rate, regular rhythm, normal heart sounds and intact distal pulses.   Pulmonary/Chest: Effort normal and breath sounds normal. No stridor. No respiratory distress. He has no wheezes. He has no rales. He exhibits no tenderness.   Abdominal: Soft. Bowel sounds are normal. He exhibits no distension and no mass. There is no tenderness. There is no rebound and no guarding.   Neurological: He is alert and oriented to person, place, and time.   Skin: Skin is warm. Capillary refill takes less than 2 seconds.   Psychiatric: He has a normal mood and affect. His behavior is normal. Thought content normal.   The patient has multiple tattoos around entire body surface.    Vitals:  Vitals:    19 0724   BP: 127/63   Pulse: 82   Resp: 18   Temp: 97.6 °F (36.4 °C)       Labs:  WBC: 4.9  Hgb: 11.3  Hct: 34.3  Platelets: 276  MCV: 77    Na+: 140  K+: 3.4  Cl-: 106  HCO3-: 26  BUN: 12  Creatinine: 0.6  Glucose: 101  Calcium: 10.2  Phosphorus: 2.8  M.7    TSH: <0.01  T3: 241  T4: 4.46   Thyroid Receptor Antibody: 26    HbA1c: 5.5    Imaging:  Imaging Results          CT Chest Without Contrast (Final result)  Result time 19 09:12:56    Final result by Shaina Ascencio MD (19 09:12:56)                 Impression:      1. Diffusely enlarged thyroid gland which is suspicious for thyroiditis.  Consider correlation with thyroid  function tests and clinical history.  2. Anterior mediastinal soft tissue mass which appears to conform to the aortic arch this could relate to thymic hyperplasia.  No focal thymic mass appreciated on the provided images as evidence of thymoma.  Lymphoma is not entirely excluded, especially given the presence of a few prominent lymph nodes in the mediastinum and at the base of the neck.  A follow-up PET-CT scan could be considered for additional evaluation as deemed clinically appropriate.      Electronically signed by: Naveen Ascencio MD  Date:    03/02/2019  Time:    09:12             Narrative:    EXAMINATION:  CT CHEST WITHOUT CONTRAST    CLINICAL HISTORY:  Neoplasm: chest, metastatic, recurrence, suspected/known;    TECHNIQUE:  Low-dose non-contrast axial images were acquired through the chest.  Subsequently, coronal and sagittal reconstructed images were generated from the source data.  Axial MIPs were generated to improve nodule detection.    COMPARISON:  Thyroid ultrasound-03/01/2019    FINDINGS:  The visualized thyroid gland is diffusely enlarged suggesting possible thyroiditis.  Please correlate with thyroid function tests and clinical history..  There is a 28 x 16 mm probable nodule pretracheal lymph node observed on series 2, image 24.  There is a triangular shaped approximately 62 x 27 mm soft tissue density which conforms to the adjacent aortic arch in the expected position of the thymus.  This measures approximately 10.7 cm in craniocaudad dimension on series 603, image 280.    The heart and great vessels are unremarkable.  The trachea and mainstem bronchi are unremarkable.  There is a mildly prominent right paratracheal lymph node which measures 13 mm in short axis on series 2, image 39.  The lungs are clear with no evidence of airspace consolidation or pulmonary nodule.  No evidence of chronic obstructive airways disease or bronchiectasis.  No pleural fluid or pneumothorax.    The visualized upper  abdominal soft tissues are unremarkable.  There is minor degenerative change of the thoracic spine.                               US Soft Tissue Head Neck Thyroid (Final result)  Result time 03/02/19 08:55:11    Final result by Onur Reich MD (03/02/19 08:55:11)                 Impression:      1. Enlarged hyperemic thyroid gland suspicious for thyroiditis.  Further evaluation with nuclear medicine thyroid scan as deemed clinically necessary.  2. Solid left thyroid nodule.  Correlate clinically with thyroid hormone levels.  Further evaluation as deemed clinically necessary.  3. Multiple benign-appearing cervical lymph nodes.  The preliminary and final reports are concordant.      Electronically signed by: Onur Reich  Date:    03/02/2019  Time:    08:55             Narrative:    EXAMINATION:  US SOFT TISSUE HEAD NECK THYROID    CLINICAL HISTORY:  graves disease;    TECHNIQUE:  Ultrasound of the thyroid and cervical lymph nodes was performed.    COMPARISON:  None.    FINDINGS:  The thyroid lobes are enlarged demonstrating a heterogeneous pattern of echogenicity measuring 5.7 x 2.9 x 2.8 cm on the right and 6.6 x 2.9 x 3.2 cm on left.  The isthmus is thickened measuring 0.90 cm.  Both thyroid lobes demonstrate increased blood flow by color Doppler consistent with hyperemia.    There is a poorly defined solid nodule of the left thyroid lobe which measures 1.9 x 1.8 x 1.9 cm.    Multiple benign cervical lymph nodes are identified all measuring less than 1.0 cm in short axis diameter.                                Assessment:  Mr. Chavez, 32 y/o M, presented with thyrotoxicosis. Patient has been on propanolol, PTU, and hydrocortisone, and his symptoms seemed to have improved with this treatment.    Plan:  Hyperthyroidism: Continue PTU. Taper steroids. Switch propanolol to atenolol outpatient.   Proceed with scheduled FNA.     F/u endocrinologist to monitor T4/T3 levels outpatient.    Nutrition: continue  well-balanced diet. Can consider Boost glucose control.     03/06/2019 This chart was completed by the Medical Student, Makeda Owens.

## 2019-03-06 NOTE — SUBJECTIVE & OBJECTIVE
Interval History:  No new symptoms.  Doing well.  Eager to get home.    Review of Systems   Constitutional: Negative for fatigue and fever.   Respiratory: Negative for cough and shortness of breath.    Cardiovascular: Negative for chest pain.   Gastrointestinal: Negative for abdominal pain.     Objective:     Vital Signs (Most Recent):  Temp: 97.7 °F (36.5 °C) (03/05/19 1507)  Pulse: 84 (03/05/19 1913)  Resp: 18 (03/05/19 1913)  BP: (!) 155/65 (03/05/19 1913)  SpO2: 99 % (03/05/19 1913) Vital Signs (24h Range):  Temp:  [97.7 °F (36.5 °C)-99 °F (37.2 °C)] 97.7 °F (36.5 °C)  Pulse:  [80-95] 84  Resp:  [14-18] 18  SpO2:  [95 %-99 %] 99 %  BP: (126-159)/(64-97) 155/65     Weight: 71 kg (156 lb 8.4 oz)  Body mass index is 23.8 kg/m².    Intake/Output Summary (Last 24 hours) at 3/5/2019 1956  Last data filed at 3/5/2019 1700  Gross per 24 hour   Intake 720 ml   Output --   Net 720 ml      Physical Exam   Constitutional: No distress.   HENT:   Mouth/Throat: No oropharyngeal exudate.   Eyes: Right eye exhibits no discharge. Left eye exhibits no discharge.   Neck: No JVD present. Thyromegaly (non-tender) present.   Cardiovascular: Normal rate and regular rhythm.   Pulmonary/Chest: Effort normal and breath sounds normal.   Abdominal: Soft. He exhibits no distension. There is no tenderness.   Musculoskeletal: He exhibits no edema.   Neurological: He is alert.   Skin: Skin is warm and dry. No rash noted. He is not diaphoretic.   Psychiatric: His behavior is normal.       Significant Labs: All pertinent labs within the past 24 hours have been reviewed.    Significant Imaging: I have reviewed all pertinent imaging results/findings within the past 24 hours.

## 2019-03-06 NOTE — ASSESSMENT & PLAN NOTE
No longer has thyrotoxicosis.  Thyrotropin receptor antibody drawn and sent to lab; waiting on results.  Reduced dose of PTU.  PTU will affect results of thyroid uptake scan, so that test will have to wait.  Continue BB.  Switch to atenolol on discharge.   made referral to [facility] network for endocrinology follow-up; it may take months to get appointment.  Pt's spouse concerned about the nodule in the thyroid and asking for biopsy.  I think it's reasonable to get FNA while he's admitted.  Radiologist won't have time to do it until Wednesday, which is tomorrow.

## 2019-03-12 NOTE — PHYSICIAN QUERY
PT Name: Jeffrey Chavez  MR #: [id number]    Physician Query Form - Pathology Findings Clarification     CDS/: Monet VILLASEÑOR                 Contact information: [email or web address]  This form is a permanent document in the medical record.     Query Date: March 12, 2019      By submitting this query, we are merely seeking further clarification of documentation.  Please utilize your independent clinical judgment when addressing the question(s) below.      The medical record contains the following:     Findings Supporting Clinical Information Location in Medical Record   FINAL PATHOLOGIC DIAGNOSIS  Left mid thyroid gland nodule, fine needle aspiration:  [place] system thyroid cytology category: (II) Benign  - Benign follicular nodule with extensive obscuring peripheral blood contamination (see note) Thyrotoxicosis with thyrotoxic crisis      Patient with evidence of thyrotoxicosis and  Impending thyroid storm.  Will start patient on PTU, propranolol, glucocorticoids, and monitor patient closely.  No indication for SSKI currently.  Patient will need thyroid ultrasound, and uptake scan to look for toxic nodule versus Graves disease     Greatly enlarged thyroid gland palpable.  Nontender       Path report 3/6     H&P 3/1     Please document the clinical significance of the Pathologists findings of _benign follicular nodule_.    [  x ] I agree with the Pathology Findings   [   ] I do not agree with the Pathology Findings   [   ] Other/Clarification of Findings:   [   ] Clinically Insignificant   [  ] Clinically Undetermined       Please document in your progress notes daily for the duration of treatment until resolved and include in your discharge summary.

## 2019-03-18 NOTE — DISCHARGE SUMMARY
"[facility] Medicine  Discharge Summary      Patient Name: Jeffrey Chavez  MRN: [id number]  Admission Date: 3/1/2019  Hospital Length of Stay: 5 days  Discharge Date and Time: 3/6/2019  1:15 PM  Attending Physician: Kelsie att. providers found   Discharging Provider: Pattie Rodriguez MD  Primary Care Provider: Primary Doctor Kelsie      HPI:   Patient is a 33-year-old with no past medical history who presents the hospital with a 50 lb weight loss over the past 2 months, as well as severe fatigue, tremor and difficulty sleeping.  Patient denies fever, night sweats, or other B symptoms.  He has had intermittent diarrhea and cough, but denies any other symptoms.  Patient states that he was trying to "tough it out "but on the day of admission he started developing shortness of breath and that scared him to come to the hospital.  In the emergency department, patient was found to be tachycardic, tachypneic, and with a fine tremor.  Labs indicate evidence of   Thyrotoxicosis.    * No surgery found *      Hospital Course:   Pt admitted with hyperthyroidism.  He has thyrotoxicosis.  Was started on large doses of PTU and also started on BB.  Condition improved.  PTU dose was lowered.  BB dose was adjusted.  Blood sample sent off for thyrotropin receptor antibody and results were positive   Thyroid US showed hyperemia and a nodule.  Pt had mild exophthalmos.  Graves Disease was a consideration.  Pt underwent biopsy of the nodule.-Pathology as follows  the above findings may represent an overactive (hot) nodule.PTU will affect results of thyroid uptake scan, so that test will have to wait.  Continue BB.  Switched to atenolol on discharge.   made referral to [facility] network for endocrinology follow-up; it may take months to get appointment  Meanwhile he is referred to fu at [facility].  Alert, nad. No exophthalmos on dc. RRR, no tachy,, l  Lungs clear. Neck -diffusely enlarged nontender " thyroid noted. Ext -no edema    He needs fu labs with TSH, Free T3 and free T4 in the next 2 weeks   And return if sx worsen. To ED if he cannot get an appointment.     Final path on nodule :   [place] system thyroid cytology category: (II) Benign  - Benign follicular nodule with extensive obscuring peripheral blood    Consider  fu PET-CT per radiology recs as followss:  Impression       1. Diffusely enlarged thyroid gland which is suspicious for thyroiditis.  Consider correlation with thyroid function tests and clinical history.  2. Anterior mediastinal soft tissue mass which appears to conform to the aortic arch this could relate to thymic hyperplasia.  No focal thymic mass appreciated on the provided images as evidence of thymoma.  Lymphoma is not entirely excluded, especially given the presence of a few prominent lymph nodes in the mediastinum and at the base of the neck.  A follow-up PET-CT scan could be considered for additional evaluation as deemed clinically appropriate       Consults: IR     * Hyperthyroidism, probably Grave's Disease    No longer has thyrotoxicosis.  Thyrotropin receptor antibody drawn and sent to lab-positive   Reduced dose of PTU.  PTU will affect results of thyroid uptake scan, so that test will have to wait.  Continue BB.  Switch to atenolol on discharge.   made referral to [facility] network for endocrinology follow-up; it may take months to get appointment.  Pt's spouse concerned about the nodule in the thyroid and asking for biopsy.  I think it's reasonable to get FNA while he's admitted.  Radiologist won't have time to do it until Wednesday, which is tomorrow.           Final Active Diagnoses:    Diagnosis Date Noted POA    PRINCIPAL PROBLEM:  Hyperthyroidism, probably Grave's Disease [E05.90] 03/04/2019 Yes    Malnutrition of moderate degree [E44.0] 03/01/2019 Yes      Problems Resolved During this Admission:    Diagnosis Date Noted Date Resolved POA    Thyrotoxicosis with  thyrotoxic crisis [E05.91] 03/01/2019 03/04/2019 Yes    Hypercalcemia [E83.52] 03/01/2019 03/04/2019 Yes       Discharged Condition: good    Disposition: Home or Self Care    Follow Up:  Follow-up Information     Go to Registration desk at [facility] to get labs drawn on March 18th..           [facility] On 3/11/2019.    Why:  @ 11 (arrive at 10:30); please bring in ID, insurance card or proof of income, and any current medication   Contact information:  [place] [address]  [phone]             [facility] APPOINTMENT LINE [facility].    Why:  call for appointment with endocrinology   Contact information:  [address]   [place] [address]  [phone]                 Patient Instructions:      T4, FREE   Standing Status: Future Standing Exp. Date: 05/02/20     T3   Standing Status: Future Standing Exp. Date: 05/02/20     Diet Adult Regular     Activity as tolerated     Activity as tolerated       Significant Diagnostic Studies:   Results for EILEEN STEELE (MRN [id number]) as of 3/18/2019 11:19   Ref. Range 3/1/2019 12:31 3/2/2019 05:25   WBC Latest Ref Range: 3.90 - 12.70 K/uL 11.20 4.90   RBC Latest Ref Range: 4.60 - 6.20 M/uL 5.17 4.44 (L)   Hemoglobin Latest Ref Range: 14.0 - 18.0 g/dL 13.1 (L) 11.3 (L)   Hematocrit Latest Ref Range: 40.0 - 54.0 % 40.2 34.3 (L)   MCV Latest Ref Range: 82 - 98 fL 78 (L) 77 (L)   MCH Latest Ref Range: 27.0 - 31.0 pg 25.3 (L) 25.4 (L)   MCHC Latest Ref Range: 32.0 - 36.0 g/dL 32.6 32.8   RDW Latest Ref Range: 11.5 - 14.5 % 13.9 13.6   Platelets Latest Ref Range: 150 - 350 K/uL 334 276     Results for EILEEN STEELE (MRN [id number]) as of 3/18/2019 11:19   Ref. Range 3/2/2019 05:25   Sodium Latest Ref Range: 136 - 145 mmol/L 136   Potassium Latest Ref Range: 3.5 - 5.1 mmol/L 4.2   Chloride Latest Ref Range: 95 - 110 mmol/L 103   CO2 Latest Ref Range: 23 - 29 mmol/L 25   Anion Gap Latest Ref Range: 8 - 16 mmol/L 8   BUN,  Bld Latest Ref Range: 6 - 20 mg/dL 16   Creatinine Latest Ref Range: 0.5 - 1.4 mg/dL 0.6   eGFR if non African American Latest Ref Range: >60 mL/min/1.73 m^2 >60   eGFR if  Latest Ref Range: >60 mL/min/1.73 m^2 >60   Glucose Latest Ref Range: 70 - 110 mg/dL 104   Calcium Latest Ref Range: 8.7 - 10.5 mg/dL 11.1 (H)   Phosphorus Latest Ref Range: 2.7 - 4.5 mg/dL 2.8   Magnesium Latest Ref Range: 1.6 - 2.6 mg/dL 1.7   Alkaline Phosphatase Latest Ref Range: 55 - 135 U/L 120   Total Protein Latest Ref Range: 6.0 - 8.4 g/dL 6.5   Albumin Latest Ref Range: 3.5 - 5.2 g/dL 3.1 (L)   Total Bilirubin Latest Ref Range: 0.1 - 1.0 mg/dL 1.4 (H)   AST Latest Ref Range: 10 - 40 U/L 49 (H)   ALT Latest Ref Range: 10 - 44 U/L 37     Results for EILEEN STEELE (MRN [id number]) as of 3/18/2019 11:19   Ref. Range 3/1/2019 12:31 3/2/2019 05:25   TSH Latest Ref Range: 0.400 - 4.000 uIU/mL <0.010 (L)    T3, Free Latest Ref Range: 2.3 - 4.2 pg/mL  >20.0 (H)   Free T4 Latest Ref Range: 0.71 - 1.51 ng/dL 4.41 (H) 4.46 (H)     Results for EILEEN STEELE (MRN [id number]) as of 3/18/2019 11:19   Ref. Range 3/3/2019 17:49   Thyrotropin Receptor Ab Latest Ref Range: 0.00 - 1.75 IU/L 26 (H)   Thyroid-Stim IG Quantitative Latest Ref Range: <0.10 IU/L >40.00 (H)   FINAL PATHOLOGIC DIAGNOSIS  Left mid thyroid gland nodule, fine needle aspiration:  [place] system thyroid cytology category: (II) Benign  - Benign follicular nodule with extensive obscuring peripheral blood contamination (see note    Pending Diagnostic Studies:     None         Medications:  Reconciled Home Medications:      Medication List      START taking these medications    atenolol 50 MG tablet  Commonly known as:  TENORMIN  Take 1 tablet (50 mg total) by mouth once daily.     diazePAM 5 MG tablet  Commonly known as:  VALIUM  Take 1 tablet (5 mg total) by mouth nightly as needed (insomnia).     ferrous sulfate 325 mg (65 mg iron) Tab tablet  Commonly known as:   FEOSOL  Take 1 tablet (325 mg total) by mouth daily with breakfast. Take with orange or tomato juice as the vitamin c helps it absorb;     predniSONE 20 MG tablet  Commonly known as:  DELTASONE  2 tabs daily for 3 days then 1 1/2 tabs daily for 3 days then 1 tab daily for 3 days then 1/2 tab daily for 3 days     propylthiouracil 50 mg Tab  Commonly known as:  PTU  Take 2 tablets (100 mg total) by mouth 3 (three) times daily.            Indwelling Lines/Drains at time of discharge:   Lines/Drains/Airways          None          Time spent on the discharge of patient: 32 minutes  Patient was seen and examined on the date of discharge and determined to be suitable for discharge.         Pattie Rodriguez MD  Department of Hospital Medicine  [facility]

## 2019-03-18 NOTE — ASSESSMENT & PLAN NOTE
No longer has thyrotoxicosis.  Thyrotropin receptor antibody drawn and sent to lab-positive   Reduced dose of PTU.  PTU will affect results of thyroid uptake scan, so that test will have to wait.  Continue BB.  Switch to atenolol on discharge.   made referral to [facility] network for endocrinology follow-up; it may take months to get appointment.  Pt's spouse concerned about the nodule in the thyroid and asking for biopsy.  I think it's reasonable to get FNA while he's admitted.  Radiologist won't have time to do it until Wednesday, which is tomorrow.

## 2020-12-28 ENCOUNTER — OFFICE VISIT (OUTPATIENT)
Dept: ENDOCRINOLOGY | Facility: CLINIC | Age: 35
End: 2020-12-28
Payer: MEDICAID

## 2020-12-28 ENCOUNTER — TELEPHONE (OUTPATIENT)
Dept: ENDOCRINOLOGY | Facility: CLINIC | Age: 35
End: 2020-12-28

## 2020-12-28 ENCOUNTER — LAB VISIT (OUTPATIENT)
Dept: LAB | Facility: HOSPITAL | Age: 35
End: 2020-12-28
Attending: INTERNAL MEDICINE
Payer: MEDICAID

## 2020-12-28 ENCOUNTER — HOSPITAL ENCOUNTER (OUTPATIENT)
Dept: RADIOLOGY | Facility: HOSPITAL | Age: 35
Discharge: HOME | End: 2020-12-28
Attending: INTERNAL MEDICINE
Payer: MEDICAID

## 2020-12-28 VITALS
SYSTOLIC BLOOD PRESSURE: 112 MMHG | DIASTOLIC BLOOD PRESSURE: 70 MMHG | HEIGHT: 68 IN | HEART RATE: 87 BPM | BODY MASS INDEX: 30.79 KG/M2 | TEMPERATURE: 98 F | OXYGEN SATURATION: 96 % | WEIGHT: 203.13 LBS

## 2020-12-28 DIAGNOSIS — E04.9 GOITER: ICD-10-CM

## 2020-12-28 DIAGNOSIS — E55.9 HYPOVITAMINOSIS D: ICD-10-CM

## 2020-12-28 DIAGNOSIS — K21.9 GASTROESOPHAGEAL REFLUX DISEASE WITHOUT ESOPHAGITIS: ICD-10-CM

## 2020-12-28 DIAGNOSIS — E05.90 HYPERTHYROIDISM: ICD-10-CM

## 2020-12-28 DIAGNOSIS — Z86.39 HISTORY OF GRAVES' DISEASE: ICD-10-CM

## 2020-12-28 DIAGNOSIS — R79.89 ABNORMAL THYROID BLOOD TEST: ICD-10-CM

## 2020-12-28 DIAGNOSIS — E88.810 DYSMETABOLIC SYNDROME: ICD-10-CM

## 2020-12-28 DIAGNOSIS — E05.90 HYPERTHYROIDISM: Primary | ICD-10-CM

## 2020-12-28 DIAGNOSIS — E05.90 THYROTOXICOSIS WITHOUT THYROID STORM, UNSPECIFIED THYROTOXICOSIS TYPE: ICD-10-CM

## 2020-12-28 DIAGNOSIS — E66.9 OBESITY (BMI 30-39.9): ICD-10-CM

## 2020-12-28 DIAGNOSIS — E06.9 THYROIDITIS: ICD-10-CM

## 2020-12-28 DIAGNOSIS — K58.8 OTHER IRRITABLE BOWEL SYNDROME: ICD-10-CM

## 2020-12-28 LAB
25(OH)D3+25(OH)D2 SERPL-MCNC: 26 NG/ML (ref 30–96)
ALBUMIN SERPL BCP-MCNC: 4.2 G/DL (ref 3.5–5.2)
ALP SERPL-CCNC: 106 U/L (ref 55–135)
ALT SERPL W/O P-5'-P-CCNC: 43 U/L (ref 10–44)
ANION GAP SERPL CALC-SCNC: 9 MMOL/L (ref 8–16)
AST SERPL-CCNC: 29 U/L (ref 10–40)
BASOPHILS # BLD AUTO: 0.07 K/UL (ref 0–0.2)
BASOPHILS NFR BLD: 0.8 % (ref 0–1.9)
BILIRUB SERPL-MCNC: 0.4 MG/DL (ref 0.1–1)
BUN SERPL-MCNC: 19 MG/DL (ref 6–20)
CA-I BLDV-SCNC: 1.35 MMOL/L (ref 1.06–1.42)
CALCIUM SERPL-MCNC: 9.9 MG/DL (ref 8.7–10.5)
CHLORIDE SERPL-SCNC: 104 MMOL/L (ref 95–110)
CHOLEST SERPL-MCNC: 182 MG/DL (ref 120–199)
CHOLEST/HDLC SERPL: 3.7 {RATIO} (ref 2–5)
CO2 SERPL-SCNC: 26 MMOL/L (ref 23–29)
CREAT SERPL-MCNC: 1 MG/DL (ref 0.5–1.4)
DIFFERENTIAL METHOD: ABNORMAL
EOSINOPHIL # BLD AUTO: 0.2 K/UL (ref 0–0.5)
EOSINOPHIL NFR BLD: 1.9 % (ref 0–8)
ERYTHROCYTE [DISTWIDTH] IN BLOOD BY AUTOMATED COUNT: 12.1 % (ref 11.5–14.5)
EST. GFR  (AFRICAN AMERICAN): >60 ML/MIN/1.73 M^2
EST. GFR  (NON AFRICAN AMERICAN): >60 ML/MIN/1.73 M^2
ESTIMATED AVG GLUCOSE: 105 MG/DL (ref 68–131)
GLUCOSE SERPL-MCNC: 66 MG/DL (ref 70–110)
HBA1C MFR BLD HPLC: 5.3 % (ref 4–5.6)
HCT VFR BLD AUTO: 47 % (ref 40–54)
HDLC SERPL-MCNC: 49 MG/DL (ref 40–75)
HDLC SERPL: 26.9 % (ref 20–50)
HGB BLD-MCNC: 15 G/DL (ref 14–18)
IMM GRANULOCYTES # BLD AUTO: 0.02 K/UL (ref 0–0.04)
IMM GRANULOCYTES NFR BLD AUTO: 0.2 % (ref 0–0.5)
INSULIN COLLECTION INTERVAL: NORMAL
INSULIN SERPL-ACNC: 18.1 UU/ML
LDLC SERPL CALC-MCNC: 95.2 MG/DL (ref 63–159)
LYMPHOCYTES # BLD AUTO: 2.4 K/UL (ref 1–4.8)
LYMPHOCYTES NFR BLD: 26.4 % (ref 18–48)
MCH RBC QN AUTO: 28.8 PG (ref 27–31)
MCHC RBC AUTO-ENTMCNC: 31.9 G/DL (ref 32–36)
MCV RBC AUTO: 90 FL (ref 82–98)
MONOCYTES # BLD AUTO: 0.7 K/UL (ref 0.3–1)
MONOCYTES NFR BLD: 7.4 % (ref 4–15)
NEUTROPHILS # BLD AUTO: 5.6 K/UL (ref 1.8–7.7)
NEUTROPHILS NFR BLD: 63.3 % (ref 38–73)
NONHDLC SERPL-MCNC: 133 MG/DL
NRBC BLD-RTO: 0 /100 WBC
PLATELET # BLD AUTO: 317 K/UL (ref 150–350)
PMV BLD AUTO: 10.6 FL (ref 9.2–12.9)
POTASSIUM SERPL-SCNC: 4.7 MMOL/L (ref 3.5–5.1)
PROT SERPL-MCNC: 8 G/DL (ref 6–8.4)
PTH-INTACT SERPL-MCNC: 65 PG/ML (ref 9–77)
RBC # BLD AUTO: 5.2 M/UL (ref 4.6–6.2)
SODIUM SERPL-SCNC: 139 MMOL/L (ref 136–145)
T3 SERPL-MCNC: 108 NG/DL (ref 60–180)
T4 FREE SERPL-MCNC: 1.04 NG/DL (ref 0.71–1.51)
THYROGLOB AB SERPL IA-ACNC: 204.2 IU/ML (ref 0–3.9)
THYROPEROXIDASE IGG SERPL-ACNC: 1571.2 IU/ML
TRIGL SERPL-MCNC: 189 MG/DL (ref 30–150)
TSH SERPL DL<=0.005 MIU/L-ACNC: 0.53 UIU/ML (ref 0.4–4)
URATE SERPL-MCNC: 6.4 MG/DL (ref 3.4–7)
WBC # BLD AUTO: 8.91 K/UL (ref 3.9–12.7)

## 2020-12-28 PROCEDURE — 84480 ASSAY TRIIODOTHYRONINE (T3): CPT

## 2020-12-28 PROCEDURE — 76536 US EXAM OF HEAD AND NECK: CPT | Mod: TC

## 2020-12-28 PROCEDURE — 99204 OFFICE O/P NEW MOD 45 MIN: CPT | Mod: PBBFAC,PO,25 | Performed by: INTERNAL MEDICINE

## 2020-12-28 PROCEDURE — 99204 OFFICE O/P NEW MOD 45 MIN: CPT | Mod: S$PBB,,, | Performed by: INTERNAL MEDICINE

## 2020-12-28 PROCEDURE — 82330 ASSAY OF CALCIUM: CPT

## 2020-12-28 PROCEDURE — 84443 ASSAY THYROID STIM HORMONE: CPT

## 2020-12-28 PROCEDURE — 84439 ASSAY OF FREE THYROXINE: CPT

## 2020-12-28 PROCEDURE — 76536 US EXAM OF HEAD AND NECK: CPT | Mod: 26,,, | Performed by: RADIOLOGY

## 2020-12-28 PROCEDURE — 85025 COMPLETE CBC W/AUTO DIFF WBC: CPT

## 2020-12-28 PROCEDURE — 84550 ASSAY OF BLOOD/URIC ACID: CPT

## 2020-12-28 PROCEDURE — 83018 HEAVY METAL QUAN EACH NES: CPT

## 2020-12-28 PROCEDURE — 36415 COLL VENOUS BLD VENIPUNCTURE: CPT | Mod: PO

## 2020-12-28 PROCEDURE — 84432 ASSAY OF THYROGLOBULIN: CPT

## 2020-12-28 PROCEDURE — 83036 HEMOGLOBIN GLYCOSYLATED A1C: CPT

## 2020-12-28 PROCEDURE — 84445 ASSAY OF TSI GLOBULIN: CPT

## 2020-12-28 PROCEDURE — 83525 ASSAY OF INSULIN: CPT

## 2020-12-28 PROCEDURE — 76536 US SOFT TISSUE HEAD NECK THYROID: ICD-10-PCS | Mod: 26,,, | Performed by: RADIOLOGY

## 2020-12-28 PROCEDURE — 86800 THYROGLOBULIN ANTIBODY: CPT | Mod: 91

## 2020-12-28 PROCEDURE — 86376 MICROSOMAL ANTIBODY EACH: CPT

## 2020-12-28 PROCEDURE — 83520 IMMUNOASSAY QUANT NOS NONAB: CPT | Mod: 59

## 2020-12-28 PROCEDURE — 99999 PR PBB SHADOW E&M-NEW PATIENT-LVL IV: CPT | Mod: PBBFAC,,, | Performed by: INTERNAL MEDICINE

## 2020-12-28 PROCEDURE — 82306 VITAMIN D 25 HYDROXY: CPT

## 2020-12-28 PROCEDURE — 83970 ASSAY OF PARATHORMONE: CPT

## 2020-12-28 PROCEDURE — 80061 LIPID PANEL: CPT

## 2020-12-28 PROCEDURE — 99204 PR OFFICE/OUTPT VISIT, NEW, LEVL IV, 45-59 MIN: ICD-10-PCS | Mod: S$PBB,,, | Performed by: INTERNAL MEDICINE

## 2020-12-28 PROCEDURE — 80053 COMPREHEN METABOLIC PANEL: CPT

## 2020-12-28 PROCEDURE — 99999 PR PBB SHADOW E&M-NEW PATIENT-LVL IV: ICD-10-PCS | Mod: PBBFAC,,, | Performed by: INTERNAL MEDICINE

## 2020-12-28 PROCEDURE — 83520 IMMUNOASSAY QUANT NOS NONAB: CPT

## 2020-12-28 RX ORDER — FAMOTIDINE 40 MG/1
TABLET, FILM COATED ORAL
COMMUNITY
Start: 2020-12-03

## 2020-12-28 RX ORDER — LINACLOTIDE 145 UG/1
CAPSULE, GELATIN COATED ORAL
COMMUNITY
Start: 2020-12-03

## 2020-12-28 RX ORDER — PROPRANOLOL HYDROCHLORIDE 20 MG/1
20 TABLET ORAL 2 TIMES DAILY
Qty: 180 TABLET | Refills: 3 | Status: SHIPPED | OUTPATIENT
Start: 2020-12-28 | End: 2021-04-26 | Stop reason: SDUPTHER

## 2020-12-28 RX ORDER — METHIMAZOLE 5 MG/1
5 TABLET ORAL 3 TIMES DAILY
Qty: 90 TABLET | Refills: 3 | Status: SHIPPED | OUTPATIENT
Start: 2020-12-28 | End: 2021-03-28

## 2020-12-28 NOTE — PROGRESS NOTES
Subjective:      Patient ID: Jeffrey Chavez is a 35 y.o. male.    Chief Complaint:  Graves' Disease    Patient is a 35 yr old gentleman seen for initial care visit today on account of hyperthyroidism presumed to be secondary to Graves disease.    History of Present Illness    The patient, Mr Chavez is a 35 yr old gentleman seen for initial care visit today on account of presumed hyperthyroidism suggested to possibly be due to Graves disease.   His background comorbidities are detailed below;    1. Hyperthyroidism    2. History of Graves' disease    3. Thyrotoxicosis without thyroid storm, unspecified thyrotoxicosis type    4. Goiter    5. Thyroiditis    6. Abnormal thyroid blood test    7. Gastroesophageal reflux disease without esophagitis    8. Other irritable bowel syndrome    9. Dysmetabolic syndrome    10. Obesity (BMI 30-39.9)      Patients baseline Clubb score is 7.  Patient first noted to possibly have Graves disease ~ 3 yrs ago when he lost a lot of Graves 3 yrs ago.  Patient had a thyroid biopsy done at initial diagnosis which was reportedly benign.  Patient was commenced on a thyroid hormone pill ?? Cause, he has been on benzodiazepenes ( valium) and a beta blocker. He has not had any definitive treatment for this thus far.      Patient paternal aunt also has Graves disease.  There is no known family history of thyroid cancer.  Patient was born [facility] and raised in [place].  Patient does not eat excessive amounts of soy products nor Cabbage. He does not eat Kelp supplements nor sea weed.  Patient has not had any significant radiation exposure.  Patient does not have local neck pain nor sorethroat, no hoarseness.  Labs from 12/2020 done @ [facility]; TPO >900, TG ab; 99 (both elevated), free T4; 1.29 (elevated). Free T3; 3.7(n).  Patient is being ffed by his PCP Dr Clark.     Patient smokes; Vapes only; Has been Vaping for ~ 2-3 yrs (does Vape Nicotine).   Patient has intermittent  "redness of his eyes. Has occasional blurring. He has been noted to increased bulging of both eyes.  He is presently not working because of the asthenia and hyper anxiety he has been having since the Grave's diagnosis. 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                Review of Systems   Constitutional: Positive for fatigue and unexpected weight change (involuntary weight loss.). Negative for activity change, appetite change and diaphoresis.   HENT: Negative for ear pain, facial swelling, hearing loss, sore throat, tinnitus, trouble swallowing and voice change.    Eyes: Positive for redness (intermittent) and itching (intermittent). Negative for photophobia, pain and visual disturbance.   Respiratory: Negative for cough and shortness of breath.    Cardiovascular: Positive for palpitations (intermittent). Negative for chest pain and leg swelling.   Gastrointestinal: Negative for abdominal distention, abdominal pain, constipation, diarrhea and nausea.   Endocrine: Negative for cold intolerance, heat intolerance, polydipsia, polyphagia and polyuria.   Genitourinary: Negative for dysuria, flank pain, frequency, hematuria and urgency.   Musculoskeletal: Negative for arthralgias, gait problem, joint swelling, myalgias and neck pain.   Skin: Negative for color change and pallor.   Neurological: Negative for tremors, seizures, syncope, weakness, light-headedness, numbness and headaches.   Hematological: Does not bruise/bleed easily.   Psychiatric/Behavioral: Positive for sleep disturbance (Mild insomnia). Negative for agitation, confusion and dysphoric mood. The patient is nervous/anxious (anxious). The patient is not hyperactive.        Objective: Temp 98.1 °F (36.7 °C) (Temporal)   Ht 5' 8" (1.727 m)   Wt 92.1 kg (203 lb 2.5 oz)   BMI 30.89 kg/m²  /70 (BP Location: Right arm, Patient Position: Sitting, BP Method: Medium (Manual))   Pulse 87   Temp 98.1 °F (36.7 °C) (Temporal)   " "Ht 5' 8" (1.727 m)   Wt 92.1 kg (203 lb 2.5 oz)   SpO2 96%   BMI 30.89 kg/m²  Body surface area is 2.1 meters squared.           Physical Exam  Vitals signs reviewed.   Constitutional:       General: He is not in acute distress.     Appearance: He is well-developed. He is obese. He is not toxic-appearing or diaphoretic.      Comments: Pleasant young man seen today with his mother present during the visit. Has mild bilateral proptosis and has extensive body tattoos. Not pale, anicteric, afebrile. Well hydrated. Not diaphoretic but appears mildly anxious.   HENT:      Head: Normocephalic and atraumatic. No right periorbital erythema or left periorbital erythema. Hair is normal.      Comments: Nil nuchal AN.     Mouth/Throat:      Mouth: Mucous membranes are moist.      Pharynx: No oropharyngeal exudate.   Eyes:      General: Lids are normal. No scleral icterus.     Extraocular Movements: Extraocular movements intact.      Right eye: No nystagmus.      Left eye: No nystagmus.      Conjunctiva/sclera: Conjunctivae normal.      Right eye: Right conjunctiva is not injected. No chemosis.     Left eye: Left conjunctiva is not injected. No chemosis.     Pupils: Pupils are equal, round, and reactive to light.        Comments: Mild bilateral symmetric proptosis. Patient has some diplopia with extreme gaze.   Neck:      Musculoskeletal: Normal range of motion and neck supple. No neck rigidity or muscular tenderness.      Thyroid: Thyromegaly (Diffuse and symmetric. No distinct nodules palpable.) present. No thyroid mass.      Vascular: No carotid bruit or JVD.      Trachea: Trachea and phonation normal. No tracheal tenderness or tracheal deviation.   Cardiovascular:      Rate and Rhythm: Normal rate and regular rhythm.      Pulses: Normal pulses.      Heart sounds: Normal heart sounds. No murmur. No gallop.    Pulmonary:      Effort: Pulmonary effort is normal. No respiratory distress.      Breath sounds: Normal breath " sounds. No decreased breath sounds, wheezing or rales.   Abdominal:      General: Bowel sounds are normal. There is no distension.      Palpations: Abdomen is soft. There is no mass.      Tenderness: There is no abdominal tenderness.   Musculoskeletal: Normal range of motion.         General: No swelling or tenderness.      Comments: No pedal edema. No digital clubbing and no tremors of outstretched hands. No calf swelling nor tenderness.   Lymphadenopathy:      Cervical: No cervical adenopathy.   Skin:     General: Skin is warm and dry.      Coloration: Skin is not jaundiced or pale.      Findings: No bruising, ecchymosis, erythema, petechiae or rash.      Nails: There is no clubbing.        Comments: Has warm mildly ert=ythematous palms but no associated clamminess nor diaphoresis.   Neurological:      General: No focal deficit present.      Mental Status: He is alert and oriented to person, place, and time.      Cranial Nerves: Cranial nerves are intact. No cranial nerve deficit.      Sensory: Sensation is intact.      Motor: Motor function is intact. No weakness, tremor or abnormal muscle tone.      Coordination: Coordination is intact.      Gait: Gait is intact. Gait normal.      Deep Tendon Reflexes: Reflexes are normal and symmetric. Reflexes normal.   Psychiatric:         Attention and Perception: Attention normal.         Mood and Affect: Mood is anxious (Mild). Mood is not depressed.         Speech: Speech normal.         Behavior: Behavior normal. Behavior is cooperative.         Thought Content: Thought content normal.         Cognition and Memory: Cognition normal.         Judgment: Judgment normal.         Lab Review:     No recent labs available [facility].    Assessment:     1. Hyperthyroidism  US Soft Tissue Head Neck Thyroid    T4, Free    T3    Thyroglobulin    TSH    Thyroid Stimulating Immunoglobulin    Thyrotropin Receptor Antibody    Thyrotropin-Binding Inhibitory  Immunoglobulin (TBII)    Iodine, Serum   2. History of Graves' disease  Thyroid Stimulating Immunoglobulin    Thyrotropin Receptor Antibody    Thyrotropin-Binding Inhibitory Immunoglobulin (TBII)    Comprehensive Metabolic Panel    propranoloL (INDERAL) 20 MG tablet    Ambulatory referral/consult to General Surgery   3. Thyrotoxicosis without thyroid storm, unspecified thyrotoxicosis type  US Soft Tissue Head Neck Thyroid    T4, Free    T3    Thyroglobulin    TSH    Thyrotropin Receptor Antibody    Thyrotropin-Binding Inhibitory Immunoglobulin (TBII)   4. Goiter  US Soft Tissue Head Neck Thyroid    Iodine, Serum    propranoloL (INDERAL) 20 MG tablet    Ambulatory referral/consult to General Surgery   5. Thyroiditis  Thyroglobulin    Anti-Thyroglobulin Antibody    Thyroid Peroxidase Antibody    Iodine, Serum    CBC Auto Differential   6. Abnormal thyroid blood test     7. Gastroesophageal reflux disease without esophagitis     8. Other irritable bowel syndrome     9. Dysmetabolic syndrome  Comprehensive Metabolic Panel    CBC Auto Differential    Uric Acid    Lipid Panel    Hemoglobin A1C    Insulin, Random   10. Obesity (BMI 30-39.9)     11. Hypovitaminosis D  Vitamin D    PTH, Intact    Calcium, Ionized      Regarding hyperthyroidism; the lab tests the patient had done with his PCP suggests that he has Hashimoto's disease but he also has clear clinical evidence of Graves disease with hyperthyroidism as well. To check full TFTs today and to commence antithyroidal medication; methimazole 5mg TID and low dose beta blockade with inderal 20mg BID for now. I have discussed with the patient and his mother the available treatmene options including symptom control, medical therapy, radio-iodine ablation and elective thyroidectomy and he would prefer to have elective thyroidectomy. Will thus refer him to see Dr Lance for same.  To obtain screening thyroid USs to exclude any coexisiting thyroid nodular disease.  Regarding  obesity and possible dysmetabolic syndrome; to obtain basic screening labs as detailed above.  Regarding possible hypovitaminosis D; to check 25 OH vit d and replete as needed.  Regarding IBS; ongoing ffup and management by his PCP.      Plan:       FFup in ~ 3mths.

## 2020-12-29 PROBLEM — E06.3 HASHIMOTO'S DISEASE: Status: ACTIVE | Noted: 2020-12-29

## 2020-12-29 PROBLEM — E88.819 INSULIN RESISTANCE: Status: ACTIVE | Noted: 2020-12-29

## 2020-12-29 PROBLEM — E16.1 HYPERINSULINEMIA: Status: ACTIVE | Noted: 2020-12-29

## 2020-12-30 ENCOUNTER — DOCUMENTATION ONLY (OUTPATIENT)
Dept: ENDOCRINOLOGY | Facility: CLINIC | Age: 35
End: 2020-12-30

## 2020-12-30 ENCOUNTER — PATIENT MESSAGE (OUTPATIENT)
Dept: ENDOCRINOLOGY | Facility: CLINIC | Age: 35
End: 2020-12-30

## 2020-12-30 LAB
IODINE SERPL-MCNC: 66 NG/ML (ref 40–92)
THRYOGLOBULIN INTERPRETATION: ABNORMAL
THYROGLOB AB SERPL-ACNC: 98 IU/ML
THYROGLOB SERPL-MCNC: 5.2 NG/ML
TSH RECEP AB SER-ACNC: 2.15 IU/L (ref 0–1.75)

## 2020-12-30 NOTE — PROGRESS NOTES
"  The following updated information has been provided for the patient by his wife;  1. He apparently was first detected to be hyperthyroid and admitted 03/19 with presumed thyroid storm and CHF.  2. He has apparently been treated with PTU; it I unclear if he is still taking that at this time.  3. He did have a FNAB of the noted left hemithyroid nodules then which revealed features consistent with benign follicular nodule and it was presumed at the time that the nodule may represent a "hot"/toxic nodules.  4. His current eval with his cardiologist shows he has mild tricuspid regurgitation and pulmonary hypertension.    "

## 2020-12-31 LAB — TSI SER-ACNC: 0.87 IU/L

## 2021-01-04 ENCOUNTER — TELEPHONE (OUTPATIENT)
Dept: SURGERY | Facility: CLINIC | Age: 36
End: 2021-01-04

## 2021-01-05 LAB — TSH BII SER-ACNC: 3.28 IU/L

## 2021-01-14 ENCOUNTER — OFFICE VISIT (OUTPATIENT)
Dept: SURGERY | Facility: CLINIC | Age: 36
End: 2021-01-14
Attending: INTERNAL MEDICINE
Payer: MEDICAID

## 2021-01-14 VITALS
BODY MASS INDEX: 30.96 KG/M2 | HEART RATE: 95 BPM | TEMPERATURE: 98 F | HEIGHT: 68 IN | DIASTOLIC BLOOD PRESSURE: 84 MMHG | SYSTOLIC BLOOD PRESSURE: 132 MMHG | WEIGHT: 204.25 LBS

## 2021-01-14 DIAGNOSIS — Z86.39 HISTORY OF GRAVES' DISEASE: Primary | ICD-10-CM

## 2021-01-14 DIAGNOSIS — E04.9 GOITER: ICD-10-CM

## 2021-01-14 PROCEDURE — 99213 OFFICE O/P EST LOW 20 MIN: CPT | Mod: PBBFAC | Performed by: SURGERY

## 2021-01-14 PROCEDURE — 99999 PR PBB SHADOW E&M-EST. PATIENT-LVL III: CPT | Mod: PBBFAC,,, | Performed by: SURGERY

## 2021-01-14 PROCEDURE — 99204 PR OFFICE/OUTPT VISIT, NEW, LEVL IV, 45-59 MIN: ICD-10-PCS | Mod: S$PBB,,, | Performed by: SURGERY

## 2021-01-14 PROCEDURE — 99204 OFFICE O/P NEW MOD 45 MIN: CPT | Mod: S$PBB,,, | Performed by: SURGERY

## 2021-01-14 PROCEDURE — 99999 PR PBB SHADOW E&M-EST. PATIENT-LVL III: ICD-10-PCS | Mod: PBBFAC,,, | Performed by: SURGERY

## 2021-04-20 ENCOUNTER — OFFICE VISIT (OUTPATIENT)
Dept: ENDOCRINOLOGY | Facility: CLINIC | Age: 36
End: 2021-04-20
Payer: MEDICAID

## 2021-04-20 DIAGNOSIS — R73.9 HYPERGLYCEMIA: ICD-10-CM

## 2021-04-20 DIAGNOSIS — E04.1 NODULAR THYROID DISEASE: ICD-10-CM

## 2021-04-20 DIAGNOSIS — E66.9 OBESITY (BMI 30-39.9): ICD-10-CM

## 2021-04-20 DIAGNOSIS — E06.3 HASHIMOTO'S DISEASE: ICD-10-CM

## 2021-04-20 DIAGNOSIS — R79.89 ABNORMAL THYROID BLOOD TEST: ICD-10-CM

## 2021-04-20 DIAGNOSIS — Z86.39 HISTORY OF GRAVES' DISEASE: Primary | ICD-10-CM

## 2021-04-20 DIAGNOSIS — E04.9 GOITER: ICD-10-CM

## 2021-04-20 DIAGNOSIS — E88.819 INSULIN RESISTANCE: ICD-10-CM

## 2021-04-20 DIAGNOSIS — E16.1 HYPERINSULINEMIA: ICD-10-CM

## 2021-04-20 DIAGNOSIS — E55.9 HYPOVITAMINOSIS D: ICD-10-CM

## 2021-04-20 DIAGNOSIS — E05.90 HYPERTHYROIDISM: ICD-10-CM

## 2021-04-20 DIAGNOSIS — E05.90 THYROTOXICOSIS WITHOUT THYROID STORM, UNSPECIFIED THYROTOXICOSIS TYPE: ICD-10-CM

## 2021-04-20 DIAGNOSIS — E88.810 DYSMETABOLIC SYNDROME: ICD-10-CM

## 2021-04-20 DIAGNOSIS — E06.9 THYROIDITIS: ICD-10-CM

## 2021-04-20 DIAGNOSIS — K21.9 GASTROESOPHAGEAL REFLUX DISEASE WITHOUT ESOPHAGITIS: ICD-10-CM

## 2021-04-20 PROCEDURE — 99999 PR PBB SHADOW E&M-EST. PATIENT-LVL I: ICD-10-PCS | Mod: PBBFAC,,, | Performed by: INTERNAL MEDICINE

## 2021-04-20 PROCEDURE — 99499 NO LOS: ICD-10-PCS | Mod: S$PBB,,, | Performed by: INTERNAL MEDICINE

## 2021-04-20 PROCEDURE — 99211 OFF/OP EST MAY X REQ PHY/QHP: CPT | Mod: PBBFAC,PO | Performed by: INTERNAL MEDICINE

## 2021-04-20 PROCEDURE — 99999 PR PBB SHADOW E&M-EST. PATIENT-LVL I: CPT | Mod: PBBFAC,,, | Performed by: INTERNAL MEDICINE

## 2021-04-20 PROCEDURE — 99499 UNLISTED E&M SERVICE: CPT | Mod: S$PBB,,, | Performed by: INTERNAL MEDICINE

## 2021-04-26 DIAGNOSIS — E04.9 GOITER: ICD-10-CM

## 2021-04-26 DIAGNOSIS — Z86.39 HISTORY OF GRAVES' DISEASE: ICD-10-CM

## 2021-04-26 RX ORDER — PROPRANOLOL HYDROCHLORIDE 20 MG/1
20 TABLET ORAL 2 TIMES DAILY
Qty: 180 TABLET | Refills: 3 | Status: SHIPPED | OUTPATIENT
Start: 2021-04-26 | End: 2021-07-25

## 2021-04-27 ENCOUNTER — TELEPHONE (OUTPATIENT)
Dept: ENDOCRINOLOGY | Facility: CLINIC | Age: 36
End: 2021-04-27

## 2022-03-31 ENCOUNTER — OCCUPATIONAL HEALTH (OUTPATIENT)
Dept: URGENT CARE | Facility: CLINIC | Age: 37
End: 2022-03-31

## 2022-03-31 PROCEDURE — 80305 DRUG TEST PRSMV DIR OPT OBS: CPT | Mod: S$GLB,,, | Performed by: NURSE PRACTITIONER

## 2022-03-31 PROCEDURE — 80305 PR NON-DOT DRUG SCREENS: ICD-10-PCS | Mod: S$GLB,,, | Performed by: NURSE PRACTITIONER

## 2022-04-05 ENCOUNTER — OCCUPATIONAL HEALTH (OUTPATIENT)
Dept: URGENT CARE | Facility: CLINIC | Age: 37
End: 2022-04-05

## 2022-04-05 PROCEDURE — 99499 PR PHYSICAL - DOT/CDL: ICD-10-PCS | Mod: S$GLB,,, | Performed by: EMERGENCY MEDICINE

## 2022-04-05 PROCEDURE — 99499 UNLISTED E&M SERVICE: CPT | Mod: S$GLB,,, | Performed by: EMERGENCY MEDICINE

## 2022-11-25 ENCOUNTER — PATIENT MESSAGE (OUTPATIENT)
Dept: ENDOCRINOLOGY | Facility: CLINIC | Age: 37
End: 2022-11-25
Payer: MEDICAID

## 2022-11-25 ENCOUNTER — TELEPHONE (OUTPATIENT)
Dept: ENDOCRINOLOGY | Facility: CLINIC | Age: 37
End: 2022-11-25
Payer: MEDICAID

## 2022-11-25 DIAGNOSIS — E05.90 HYPERTHYROIDISM: ICD-10-CM

## 2022-11-25 DIAGNOSIS — E06.9 THYROIDITIS: ICD-10-CM

## 2022-11-25 DIAGNOSIS — E88.810 DYSMETABOLIC SYNDROME: ICD-10-CM

## 2022-11-25 DIAGNOSIS — E88.819 INSULIN RESISTANCE: ICD-10-CM

## 2022-11-25 DIAGNOSIS — E06.3 HASHIMOTO'S DISEASE: ICD-10-CM

## 2022-11-25 DIAGNOSIS — E55.9 HYPOVITAMINOSIS D: Primary | ICD-10-CM

## 2022-11-25 DIAGNOSIS — R73.9 HYPERGLYCEMIA: ICD-10-CM

## 2022-11-25 DIAGNOSIS — E04.1 NODULAR THYROID DISEASE: ICD-10-CM

## 2022-11-28 ENCOUNTER — PATIENT MESSAGE (OUTPATIENT)
Dept: ENDOCRINOLOGY | Facility: CLINIC | Age: 37
End: 2022-11-28
Payer: MEDICAID

## 2023-01-18 ENCOUNTER — LAB VISIT (OUTPATIENT)
Dept: LAB | Facility: HOSPITAL | Age: 38
End: 2023-01-18
Attending: INTERNAL MEDICINE
Payer: MEDICAID

## 2023-01-18 DIAGNOSIS — E88.810 DYSMETABOLIC SYNDROME: ICD-10-CM

## 2023-01-18 DIAGNOSIS — E55.9 HYPOVITAMINOSIS D: ICD-10-CM

## 2023-01-18 DIAGNOSIS — E06.3 HASHIMOTO'S DISEASE: ICD-10-CM

## 2023-01-18 DIAGNOSIS — E06.9 THYROIDITIS: ICD-10-CM

## 2023-01-18 DIAGNOSIS — E05.90 HYPERTHYROIDISM: ICD-10-CM

## 2023-01-18 DIAGNOSIS — E04.1 NODULAR THYROID DISEASE: ICD-10-CM

## 2023-01-18 DIAGNOSIS — R73.9 HYPERGLYCEMIA: ICD-10-CM

## 2023-01-18 LAB
25(OH)D3+25(OH)D2 SERPL-MCNC: 35 NG/ML (ref 30–96)
ALBUMIN SERPL BCP-MCNC: 4.6 G/DL (ref 3.5–5.2)
ALP SERPL-CCNC: 114 U/L (ref 55–135)
ALT SERPL W/O P-5'-P-CCNC: 30 U/L (ref 10–44)
ANION GAP SERPL CALC-SCNC: 8 MMOL/L (ref 8–16)
AST SERPL-CCNC: 28 U/L (ref 10–40)
BASOPHILS # BLD AUTO: 0.07 K/UL (ref 0–0.2)
BASOPHILS NFR BLD: 0.7 % (ref 0–1.9)
BILIRUB SERPL-MCNC: 0.9 MG/DL (ref 0.1–1)
BUN SERPL-MCNC: 18 MG/DL (ref 6–20)
CA-I BLDV-SCNC: 1.32 MMOL/L (ref 1.06–1.42)
CALCIUM SERPL-MCNC: 10.4 MG/DL (ref 8.7–10.5)
CHLORIDE SERPL-SCNC: 102 MMOL/L (ref 95–110)
CHOLEST SERPL-MCNC: 213 MG/DL (ref 120–199)
CHOLEST/HDLC SERPL: 4.7 {RATIO} (ref 2–5)
CO2 SERPL-SCNC: 24 MMOL/L (ref 23–29)
CREAT SERPL-MCNC: 1 MG/DL (ref 0.5–1.4)
DIFFERENTIAL METHOD: NORMAL
EOSINOPHIL # BLD AUTO: 0.4 K/UL (ref 0–0.5)
EOSINOPHIL NFR BLD: 3.8 % (ref 0–8)
ERYTHROCYTE [DISTWIDTH] IN BLOOD BY AUTOMATED COUNT: 11.9 % (ref 11.5–14.5)
ERYTHROCYTE [SEDIMENTATION RATE] IN BLOOD BY WESTERGREN METHOD: 9 MM/HR (ref 0–10)
EST. GFR  (NO RACE VARIABLE): >60 ML/MIN/1.73 M^2
ESTIMATED AVG GLUCOSE: 105 MG/DL (ref 68–131)
GLUCOSE SERPL-MCNC: 85 MG/DL (ref 70–110)
HBA1C MFR BLD: 5.3 % (ref 4–5.6)
HCT VFR BLD AUTO: 42.1 % (ref 40–54)
HDLC SERPL-MCNC: 45 MG/DL (ref 40–75)
HDLC SERPL: 21.1 % (ref 20–50)
HGB BLD-MCNC: 14.1 G/DL (ref 14–18)
IMM GRANULOCYTES # BLD AUTO: 0.02 K/UL (ref 0–0.04)
IMM GRANULOCYTES NFR BLD AUTO: 0.2 % (ref 0–0.5)
LDLC SERPL CALC-MCNC: 142.2 MG/DL (ref 63–159)
LYMPHOCYTES # BLD AUTO: 2.4 K/UL (ref 1–4.8)
LYMPHOCYTES NFR BLD: 24.4 % (ref 18–48)
MCH RBC QN AUTO: 29.1 PG (ref 27–31)
MCHC RBC AUTO-ENTMCNC: 33.5 G/DL (ref 32–36)
MCV RBC AUTO: 87 FL (ref 82–98)
MONOCYTES # BLD AUTO: 0.8 K/UL (ref 0.3–1)
MONOCYTES NFR BLD: 8 % (ref 4–15)
NEUTROPHILS # BLD AUTO: 6.3 K/UL (ref 1.8–7.7)
NEUTROPHILS NFR BLD: 62.9 % (ref 38–73)
NONHDLC SERPL-MCNC: 168 MG/DL
NRBC BLD-RTO: 0 /100 WBC
PLATELET # BLD AUTO: 307 K/UL (ref 150–450)
PMV BLD AUTO: 11 FL (ref 9.2–12.9)
POTASSIUM SERPL-SCNC: 4.5 MMOL/L (ref 3.5–5.1)
PROT SERPL-MCNC: 8.6 G/DL (ref 6–8.4)
PTH-INTACT SERPL-MCNC: 73 PG/ML (ref 9–77)
RBC # BLD AUTO: 4.84 M/UL (ref 4.6–6.2)
SODIUM SERPL-SCNC: 134 MMOL/L (ref 136–145)
T3 SERPL-MCNC: 121 NG/DL (ref 60–180)
T4 FREE SERPL-MCNC: 1.07 NG/DL (ref 0.71–1.51)
TRIGL SERPL-MCNC: 129 MG/DL (ref 30–150)
TSH SERPL DL<=0.005 MIU/L-ACNC: 1.8 UIU/ML (ref 0.4–4)
URATE SERPL-MCNC: 6.5 MG/DL (ref 3.4–7)
WBC # BLD AUTO: 10.01 K/UL (ref 3.9–12.7)

## 2023-01-18 PROCEDURE — 36415 COLL VENOUS BLD VENIPUNCTURE: CPT | Mod: PO | Performed by: INTERNAL MEDICINE

## 2023-01-18 PROCEDURE — 84443 ASSAY THYROID STIM HORMONE: CPT | Performed by: INTERNAL MEDICINE

## 2023-01-18 PROCEDURE — 84480 ASSAY TRIIODOTHYRONINE (T3): CPT | Performed by: INTERNAL MEDICINE

## 2023-01-18 PROCEDURE — 86376 MICROSOMAL ANTIBODY EACH: CPT | Performed by: INTERNAL MEDICINE

## 2023-01-18 PROCEDURE — 83036 HEMOGLOBIN GLYCOSYLATED A1C: CPT | Performed by: INTERNAL MEDICINE

## 2023-01-18 PROCEDURE — 85025 COMPLETE CBC W/AUTO DIFF WBC: CPT | Performed by: INTERNAL MEDICINE

## 2023-01-18 PROCEDURE — 83970 ASSAY OF PARATHORMONE: CPT | Performed by: INTERNAL MEDICINE

## 2023-01-18 PROCEDURE — 85651 RBC SED RATE NONAUTOMATED: CPT | Mod: PO | Performed by: INTERNAL MEDICINE

## 2023-01-18 PROCEDURE — 84445 ASSAY OF TSI GLOBULIN: CPT | Performed by: INTERNAL MEDICINE

## 2023-01-18 PROCEDURE — 82306 VITAMIN D 25 HYDROXY: CPT | Performed by: INTERNAL MEDICINE

## 2023-01-18 PROCEDURE — 84550 ASSAY OF BLOOD/URIC ACID: CPT | Performed by: INTERNAL MEDICINE

## 2023-01-18 PROCEDURE — 82330 ASSAY OF CALCIUM: CPT | Performed by: INTERNAL MEDICINE

## 2023-01-18 PROCEDURE — 86800 THYROGLOBULIN ANTIBODY: CPT | Mod: 91 | Performed by: INTERNAL MEDICINE

## 2023-01-18 PROCEDURE — 83520 IMMUNOASSAY QUANT NOS NONAB: CPT | Performed by: INTERNAL MEDICINE

## 2023-01-18 PROCEDURE — 80053 COMPREHEN METABOLIC PANEL: CPT | Performed by: INTERNAL MEDICINE

## 2023-01-18 PROCEDURE — 86800 THYROGLOBULIN ANTIBODY: CPT | Performed by: INTERNAL MEDICINE

## 2023-01-18 PROCEDURE — 84439 ASSAY OF FREE THYROXINE: CPT | Performed by: INTERNAL MEDICINE

## 2023-01-18 PROCEDURE — 80061 LIPID PANEL: CPT | Performed by: INTERNAL MEDICINE

## 2023-01-18 PROCEDURE — 83520 IMMUNOASSAY QUANT NOS NONAB: CPT | Mod: 91 | Performed by: INTERNAL MEDICINE

## 2023-01-18 PROCEDURE — 83018 HEAVY METAL QUAN EACH NES: CPT | Performed by: INTERNAL MEDICINE

## 2023-01-19 LAB
THYROGLOB AB SERPL IA-ACNC: 113.5 IU/ML (ref 0–3.9)
THYROPEROXIDASE IGG SERPL-ACNC: 1339 IU/ML

## 2023-01-20 LAB
IODINE SERPL-MCNC: 71 NG/ML (ref 40–92)
THRYOGLOBULIN INTERPRETATION: ABNORMAL
THYROGLOB AB SERPL-ACNC: 22 IU/ML
THYROGLOB SERPL-MCNC: 0.5 NG/ML
TSH RECEP AB SER-ACNC: <1.1 IU/L (ref 0–1.75)

## 2023-01-23 LAB — TSI SER-ACNC: 0.23 IU/L

## 2023-01-27 LAB — TSH BII SER-ACNC: <1 IU/L

## 2023-03-07 ENCOUNTER — OCCUPATIONAL HEALTH (OUTPATIENT)
Dept: URGENT CARE | Facility: CLINIC | Age: 38
End: 2023-03-07

## 2023-03-07 DIAGNOSIS — Z00.00 ENCOUNTER FOR PHYSICAL EXAMINATION: Primary | ICD-10-CM

## 2023-03-07 LAB
COLLECTION ONLY: NORMAL
COLLECTION ONLY: NORMAL

## 2023-03-07 PROCEDURE — 80306 DRUG TEST PRSMV INSTRMNT: CPT | Mod: S$GLB,,, | Performed by: NURSE PRACTITIONER

## 2023-03-07 PROCEDURE — 99499 UNLISTED E&M SERVICE: CPT | Mod: S$GLB,,, | Performed by: NURSE PRACTITIONER

## 2023-03-07 PROCEDURE — 99499 DOT PHYSICAL: ICD-10-PCS | Mod: S$GLB,,, | Performed by: NURSE PRACTITIONER

## 2023-03-07 PROCEDURE — 80306 OOH DOT DRUG SCREEN: ICD-10-PCS | Mod: S$GLB,,, | Performed by: NURSE PRACTITIONER

## 2023-03-17 ENCOUNTER — OCCUPATIONAL HEALTH (OUTPATIENT)
Dept: URGENT CARE | Facility: CLINIC | Age: 38
End: 2023-03-17

## 2023-03-17 DIAGNOSIS — Z02.1 PRE-EMPLOYMENT EXAMINATION: Primary | ICD-10-CM

## 2023-03-17 LAB — COLLECTION ONLY: NORMAL

## 2023-03-17 PROCEDURE — 99499 DOT PHYSICAL: ICD-10-PCS | Mod: S$GLB,,, | Performed by: NURSE PRACTITIONER

## 2023-03-17 PROCEDURE — 99499 UNLISTED E&M SERVICE: CPT | Mod: S$GLB,,, | Performed by: NURSE PRACTITIONER

## 2024-11-26 ENCOUNTER — OCCUPATIONAL HEALTH (OUTPATIENT)
Dept: URGENT CARE | Facility: CLINIC | Age: 39
End: 2024-11-26

## 2024-11-26 DIAGNOSIS — Z00.00 ROUTINE GENERAL MEDICAL EXAMINATION AT A HEALTH CARE FACILITY: Primary | ICD-10-CM
